# Patient Record
Sex: FEMALE | Race: WHITE
[De-identification: names, ages, dates, MRNs, and addresses within clinical notes are randomized per-mention and may not be internally consistent; named-entity substitution may affect disease eponyms.]

---

## 2017-08-05 ENCOUNTER — HEALTH MAINTENANCE LETTER (OUTPATIENT)
Age: 27
End: 2017-08-05

## 2018-02-10 ENCOUNTER — HEALTH MAINTENANCE LETTER (OUTPATIENT)
Age: 28
End: 2018-02-10

## 2018-08-12 ENCOUNTER — HEALTH MAINTENANCE LETTER (OUTPATIENT)
Age: 28
End: 2018-08-12

## 2019-03-09 ENCOUNTER — HEALTH MAINTENANCE LETTER (OUTPATIENT)
Age: 29
End: 2019-03-09

## 2019-11-05 ENCOUNTER — HEALTH MAINTENANCE LETTER (OUTPATIENT)
Age: 29
End: 2019-11-05

## 2020-11-22 ENCOUNTER — HEALTH MAINTENANCE LETTER (OUTPATIENT)
Age: 30
End: 2020-11-22

## 2021-09-19 ENCOUNTER — HEALTH MAINTENANCE LETTER (OUTPATIENT)
Age: 31
End: 2021-09-19

## 2022-01-09 ENCOUNTER — HEALTH MAINTENANCE LETTER (OUTPATIENT)
Age: 32
End: 2022-01-09

## 2022-07-06 ENCOUNTER — OFFICE VISIT (OUTPATIENT)
Dept: FAMILY MEDICINE | Facility: CLINIC | Age: 32
End: 2022-07-06
Payer: MEDICAID

## 2022-07-06 VITALS
BODY MASS INDEX: 22.97 KG/M2 | SYSTOLIC BLOOD PRESSURE: 115 MMHG | WEIGHT: 137 LBS | HEART RATE: 78 BPM | DIASTOLIC BLOOD PRESSURE: 82 MMHG

## 2022-07-06 DIAGNOSIS — F41.1 GAD (GENERALIZED ANXIETY DISORDER): ICD-10-CM

## 2022-07-06 DIAGNOSIS — F33.3 SEVERE RECURRENT MAJOR DEPRESSIVE DISORDER WITH PSYCHOTIC FEATURES (H): Primary | ICD-10-CM

## 2022-07-06 PROBLEM — K90.0 CELIAC DISEASE: Status: ACTIVE | Noted: 2022-07-06

## 2022-07-06 PROBLEM — E73.9 LACTOSE INTOLERANCE: Status: ACTIVE | Noted: 2022-07-06

## 2022-07-06 PROCEDURE — 99204 OFFICE O/P NEW MOD 45 MIN: CPT | Performed by: NURSE PRACTITIONER

## 2022-07-06 PROCEDURE — 96127 BRIEF EMOTIONAL/BEHAV ASSMT: CPT | Performed by: NURSE PRACTITIONER

## 2022-07-06 RX ORDER — HYDROXYZINE PAMOATE 25 MG/1
25 CAPSULE ORAL DAILY PRN
Qty: 24 CAPSULE | Refills: 1 | Status: SHIPPED | OUTPATIENT
Start: 2022-07-06 | End: 2022-08-05

## 2022-07-06 ASSESSMENT — ANXIETY QUESTIONNAIRES
3. WORRYING TOO MUCH ABOUT DIFFERENT THINGS: NEARLY EVERY DAY
GAD7 TOTAL SCORE: 21
7. FEELING AFRAID AS IF SOMETHING AWFUL MIGHT HAPPEN: NEARLY EVERY DAY
5. BEING SO RESTLESS THAT IT IS HARD TO SIT STILL: NEARLY EVERY DAY
6. BECOMING EASILY ANNOYED OR IRRITABLE: NEARLY EVERY DAY
8. IF YOU CHECKED OFF ANY PROBLEMS, HOW DIFFICULT HAVE THESE MADE IT FOR YOU TO DO YOUR WORK, TAKE CARE OF THINGS AT HOME, OR GET ALONG WITH OTHER PEOPLE?: VERY DIFFICULT
1. FEELING NERVOUS, ANXIOUS, OR ON EDGE: NEARLY EVERY DAY
7. FEELING AFRAID AS IF SOMETHING AWFUL MIGHT HAPPEN: NEARLY EVERY DAY
GAD7 TOTAL SCORE: 21
2. NOT BEING ABLE TO STOP OR CONTROL WORRYING: NEARLY EVERY DAY
4. TROUBLE RELAXING: NEARLY EVERY DAY
GAD7 TOTAL SCORE: 21

## 2022-07-06 ASSESSMENT — PATIENT HEALTH QUESTIONNAIRE - PHQ9
SUM OF ALL RESPONSES TO PHQ QUESTIONS 1-9: 21
SUM OF ALL RESPONSES TO PHQ QUESTIONS 1-9: 21
10. IF YOU CHECKED OFF ANY PROBLEMS, HOW DIFFICULT HAVE THESE PROBLEMS MADE IT FOR YOU TO DO YOUR WORK, TAKE CARE OF THINGS AT HOME, OR GET ALONG WITH OTHER PEOPLE: VERY DIFFICULT
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
## 2022-07-06 NOTE — LETTER
My Depression Action Plan  Name: Ashanti Narayan   Date of Birth 1990  Date: 7/6/2022    My doctor: Zelalem Norton   My clinic: [address] [address] [phone]          GREEN    ZONE   Good Control    What it looks like:     Things are going generally well. You have normal ups and downs. You may even feel depressed from time to time, but bad moods usually last less than a day.   What you need to do:  1. Continue to care for yourself (see self care plan)  2. Check your depression survival kit and update it as needed  3. Follow your physician s recommendations including any medication.  4. Do not stop taking medication unless you consult with your physician first.           YELLOW         ZONE Getting Worse    What it looks like:     Depression is starting to interfere with your life.     It may be hard to get out of bed; you may be starting to isolate yourself from others.    Symptoms of depression are starting to last most all day and this has happened for several days.     You may have suicidal thoughts but they are not constant.   What you need to do:     1. Call your care team. Your response to treatment will improve if you keep your care team informed of your progress. Yellow periods are signs an adjustment may need to be made.     2. Continue your self-care.  Just get dressed and ready for the day.  Don't give yourself time to talk yourself out of it.    3. Talk to someone in your support network.    4. Open up your Depression Self-Care Plan/Wellness Kit.           RED    ZONE Medical Alert - Get Help    What it looks like:     Depression is seriously interfering with your life.     You may experience these or other symptoms: You can t get out of bed most days, can t work or engage in other necessary activities, you have trouble taking care of basic hygiene, or basic responsibilities, thoughts of suicide or death that  will not go away, self-injurious behavior.     What you need to do:  1. Call your care team and request a same-day appointment. If they are not available (weekends or after hours) call your local crisis line, emergency room or 911.          Depression Self-Care Plan / Wellness Kit    Many people find that medication and therapy are helpful treatments for managing depression. In addition, making small changes to your everyday life can help to boost your mood and improve your wellbeing. Below are some tips for you to consider. Be sure to talk with your medical provider and/or behavioral health consultant if your symptoms are worsening or not improving.     Sleep   Sleep hygiene  means all of the habits that support good, restful sleep. It includes maintaining a consistent bedtime and wake time, using your bedroom only for sleeping or sex, and keeping the bedroom dark and free of distractions like a computer, smartphone, or television.     Develop a Healthy Routine  Maintain good hygiene. Get out of bed in the morning, make your bed, brush your teeth, take a shower, and get dressed. Don t spend too much time viewing media that makes you feel stressed. Find time to relax each day.    Exercise  Get some form of exercise every day. This will help reduce pain and release endorphins, the  feel good  chemicals in your brain. It can be as simple as just going for a walk or doing some gardening, anything that will get you moving.      Diet  Strive to eat healthy foods, including fruits and vegetables. Drink plenty of water. Avoid excessive sugar, caffeine, alcohol, and other mood-altering substances.     Stay Connected with Others  Stay in touch with friends and family members.    Manage Your Mood  Try deep breathing, massage therapy, biofeedback, or meditation. Take part in fun activities when you can. Try to find something to smile about each day.     Psychotherapy  Be open to working with a therapist if your provider  recommends it.     Medication  Be sure to take your medication as prescribed. Most anti-depressants need to be taken every day. It usually takes several weeks for medications to work. Not all medicines work for all people. It is important to follow-up with your provider to make sure you have a treatment plan that is working for you. Do not stop your medication abruptly without first discussing it with your provider.    Crisis Resources   These hotlines are for both adults and children. They and are open 24 hours a day, 7 days a week unless noted otherwise.      National Suicide Prevention Lifeline   3-564-978-ISSK (5587)      Crisis Text Line    www.crisistextline.org  Text HOME to 863952 from anywhere in the [place], anytime, about any type of crisis. A live, trained crisis counselor will receive the text and respond quickly.      [facility] Lifeline for LGBTQ Youth  A national crisis intervention and suicide lifeline for LGBTQ youth under 25. Provides a safe place to talk without judgement. Call 1-963.376.2494; text START to 819741 or visit www.thetrevorproject.org to talk to a trained counselor.      For [facility] crisis numbers, visit the [facility] website at:  https://[place].gov/dhs/people-we-serve/adults/health-care/mental-health/resources/crisis-contacts.jsp

## 2022-08-04 DIAGNOSIS — F41.1 GAD (GENERALIZED ANXIETY DISORDER): ICD-10-CM

## 2022-08-04 DIAGNOSIS — F33.3 SEVERE RECURRENT MAJOR DEPRESSIVE DISORDER WITH PSYCHOTIC FEATURES (H): ICD-10-CM

## 2022-08-05 RX ORDER — HYDROXYZINE PAMOATE 25 MG/1
25 CAPSULE ORAL DAILY PRN
Qty: 24 CAPSULE | Refills: 0 | Status: SHIPPED | OUTPATIENT
Start: 2022-08-05 | End: 2023-11-10

## 2022-08-05 NOTE — TELEPHONE ENCOUNTER
TC- please contact patient. 30 day supply of medication sent to pharmacy. Patient is due for follow up visit.      Last Written Prescription Date:  7/6/22  Last Fill Quantity: (45),  # refills: (1)  Last office visit: 7/6/2022   Future Office Visit:      (Due 6 weeks after 7/6/22 for follow up).

## 2022-10-03 ENCOUNTER — OFFICE VISIT (OUTPATIENT)
Dept: FAMILY MEDICINE | Facility: CLINIC | Age: 32
End: 2022-10-03
Payer: COMMERCIAL

## 2022-10-03 VITALS
BODY MASS INDEX: 23.49 KG/M2 | HEIGHT: 65 IN | SYSTOLIC BLOOD PRESSURE: 110 MMHG | HEART RATE: 81 BPM | WEIGHT: 141 LBS | DIASTOLIC BLOOD PRESSURE: 72 MMHG | TEMPERATURE: 98.4 F

## 2022-10-03 DIAGNOSIS — G56.01 CARPAL TUNNEL SYNDROME OF RIGHT WRIST: Primary | ICD-10-CM

## 2022-10-03 PROCEDURE — 99213 OFFICE O/P EST LOW 20 MIN: CPT | Performed by: FAMILY MEDICINE

## 2022-10-03 RX ORDER — PREDNISONE 20 MG/1
20 TABLET ORAL DAILY
Qty: 5 TABLET | Refills: 0 | Status: SHIPPED | OUTPATIENT
Start: 2022-10-03 | End: 2022-10-08

## 2022-10-03 ASSESSMENT — PATIENT HEALTH QUESTIONNAIRE - PHQ9
SUM OF ALL RESPONSES TO PHQ QUESTIONS 1-9: 6
SUM OF ALL RESPONSES TO PHQ QUESTIONS 1-9: 6
10. IF YOU CHECKED OFF ANY PROBLEMS, HOW DIFFICULT HAVE THESE PROBLEMS MADE IT FOR YOU TO DO YOUR WORK, TAKE CARE OF THINGS AT HOME, OR GET ALONG WITH OTHER PEOPLE: SOMEWHAT DIFFICULT

## 2022-10-03 NOTE — LETTER
October 3, 2022      Ashanti Narayan  [address] [place] [id number]        To Whom It May Concern:    Ashanti Narayan was seen in our clinic. She may return to work with the following: limited to light duty - lifting no greater than 20 pounds and no repetitive motions and wear wrist splint for about 2-3 weeks - until seen in follow up.      Sincerely,        Shweta Arias MD
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
## 2022-10-19 ENCOUNTER — OFFICE VISIT (OUTPATIENT)
Dept: FAMILY MEDICINE | Facility: CLINIC | Age: 32
End: 2022-10-19
Payer: COMMERCIAL

## 2022-10-19 VITALS
TEMPERATURE: 98.2 F | HEART RATE: 74 BPM | OXYGEN SATURATION: 97 % | BODY MASS INDEX: 23.9 KG/M2 | DIASTOLIC BLOOD PRESSURE: 60 MMHG | SYSTOLIC BLOOD PRESSURE: 96 MMHG | WEIGHT: 142.5 LBS

## 2022-10-19 DIAGNOSIS — G56.01 CARPAL TUNNEL SYNDROME OF RIGHT WRIST: Primary | ICD-10-CM

## 2022-10-19 DIAGNOSIS — F41.1 GAD (GENERALIZED ANXIETY DISORDER): ICD-10-CM

## 2022-10-19 DIAGNOSIS — F33.3 SEVERE RECURRENT MAJOR DEPRESSIVE DISORDER WITH PSYCHOTIC FEATURES (H): ICD-10-CM

## 2022-10-19 PROCEDURE — 96127 BRIEF EMOTIONAL/BEHAV ASSMT: CPT | Performed by: NURSE PRACTITIONER

## 2022-10-19 PROCEDURE — 99214 OFFICE O/P EST MOD 30 MIN: CPT | Performed by: NURSE PRACTITIONER

## 2022-10-19 RX ORDER — FLUOXETINE HYDROCHLORIDE 60 MG/1
60 TABLET, FILM COATED ORAL; ORAL DAILY
Qty: 30 TABLET | Refills: 1 | Status: SHIPPED | OUTPATIENT
Start: 2022-10-19 | End: 2023-11-10

## 2022-10-19 RX ORDER — PROPRANOLOL HYDROCHLORIDE 20 MG/1
20 TABLET ORAL DAILY PRN
Qty: 30 TABLET | Refills: 1 | Status: SHIPPED | OUTPATIENT
Start: 2022-10-19 | End: 2023-11-10

## 2022-10-19 ASSESSMENT — ENCOUNTER SYMPTOMS: NUMBNESS: 1

## 2022-10-19 ASSESSMENT — ANXIETY QUESTIONNAIRES
7. FEELING AFRAID AS IF SOMETHING AWFUL MIGHT HAPPEN: MORE THAN HALF THE DAYS
1. FEELING NERVOUS, ANXIOUS, OR ON EDGE: MORE THAN HALF THE DAYS
GAD7 TOTAL SCORE: 14
2. NOT BEING ABLE TO STOP OR CONTROL WORRYING: MORE THAN HALF THE DAYS
3. WORRYING TOO MUCH ABOUT DIFFERENT THINGS: MORE THAN HALF THE DAYS
5. BEING SO RESTLESS THAT IT IS HARD TO SIT STILL: MORE THAN HALF THE DAYS
GAD7 TOTAL SCORE: 14
4. TROUBLE RELAXING: MORE THAN HALF THE DAYS
7. FEELING AFRAID AS IF SOMETHING AWFUL MIGHT HAPPEN: MORE THAN HALF THE DAYS
6. BECOMING EASILY ANNOYED OR IRRITABLE: MORE THAN HALF THE DAYS
IF YOU CHECKED OFF ANY PROBLEMS ON THIS QUESTIONNAIRE, HOW DIFFICULT HAVE THESE PROBLEMS MADE IT FOR YOU TO DO YOUR WORK, TAKE CARE OF THINGS AT HOME, OR GET ALONG WITH OTHER PEOPLE: SOMEWHAT DIFFICULT
GAD7 TOTAL SCORE: 14
8. IF YOU CHECKED OFF ANY PROBLEMS, HOW DIFFICULT HAVE THESE MADE IT FOR YOU TO DO YOUR WORK, TAKE CARE OF THINGS AT HOME, OR GET ALONG WITH OTHER PEOPLE?: SOMEWHAT DIFFICULT

## 2022-10-19 ASSESSMENT — PATIENT HEALTH QUESTIONNAIRE - PHQ9
10. IF YOU CHECKED OFF ANY PROBLEMS, HOW DIFFICULT HAVE THESE PROBLEMS MADE IT FOR YOU TO DO YOUR WORK, TAKE CARE OF THINGS AT HOME, OR GET ALONG WITH OTHER PEOPLE: SOMEWHAT DIFFICULT
SUM OF ALL RESPONSES TO PHQ QUESTIONS 1-9: 10
SUM OF ALL RESPONSES TO PHQ QUESTIONS 1-9: 10

## 2022-10-19 NOTE — PROGRESS NOTES
Assessment & Plan     (G56.01) Carpal tunnel syndrome of right wrist  (primary encounter diagnosis)  Comment: Some improvement but still having numbness and tingling in 3 of the fingers on the right hand.  Written a note for her employer regarding work restrictions.  We will set her up with orthopedics  Plan: Orthopedic  Referral            (F41.1) PHILIP (generalized anxiety disorder)  Comment: Improving and that she is sleeping better and her mood is improved.  She has more energy.  Feels less anxious.  She would like to increase dose.  She also did poorly with hydroxyzine as she had some tic symptoms.  She has no contraindications to propranolol and estimates that she would only need to use it a couple times a week.  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    Tiffany Oropeza NP  [facility]    Curtis Burrell is a 31 year old presenting for the following health issues: following up on right wrist - numbness in the fingers, soreness, stiffness in the wrist - not much improvement, is wearing wrist brace 24/7, does not have numbness in second digit of right hand, that would be the only change, also now having some swelling in the fingers    Was seen about 3 weeks ago for carpal tunnel in urgent care.  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  She is not had carpal tunnel in the past but another job volatile at work processing as well.  She seen some improvement with wearing her splint and a burst of prednisone as she no longer has tingling in the pointer  finger but the third fourth and fifth finger the right hand which is her dominant hand still numb and tingly.  She takes brace off about every hour and does some range of motion.  Sometimes her fingers get very pale    She would also like to address refills on her depression and anxiety medication.  Fluoxetine was restarted in July for symptom's of both depression and anxiety.  She was given hydroxyzine for panic and took that a few times but definitely had some jerking symptoms when she used it.  She would like to increase her propranolol.  Its been very effective but she would like better control.  Her PHQ-9 score is 10 with no thoughts of self-harm and her PHILIP-7 is 14.  Musculoskeletal Problem and Numbness      Musculoskeletal Problem  Associated symptoms include numbness.   Numbness  Associated symptoms include numbness.   History of Present Illness       Reason for visit:  Keshia on wrist  and mental health check    She eats 2-3 servings of fruits and vegetables daily.She consumes 0 sweetened beverage(s) daily.She exercises with enough effort to increase her heart rate 60 or more minutes per day.  She exercises with enough effort to increase her heart rate 4 days per week.   She is taking medications regularly.    Today's PHQ-9         PHQ-9 Total Score: 10    PHQ-9 Q9 Thoughts of better off dead/self-harm past 2 weeks :   Not at all    How difficult have these problems made it for you to do your work, take care of things at home, or get along with other people: Somewhat difficult  Today's PHILIP-7 Score: 14             Review of Systems   Neurological: Positive for numbness.            Objective    BP 96/60 (BP Location: Right arm, Patient Position: Sitting)   Pulse 74   Temp 98.2  F (36.8  C)   Wt 64.6 kg (142 lb 8 oz)   LMP 10/18/2013   SpO2 97%   BMI 23.90 kg/m    Body mass index is 23.9 kg/m .  Physical Exam     Good color and motion of fingers on her right hand no swelling currently  Good eye contact  speech is not forced, she is engaging with conversation

## 2022-10-19 NOTE — LETTER
[facility]  [address] [address]  [phone]  Dept: [phone]      10/19/2022    Re: Ashanti Narayan      TO WHOM IT MAY CONCERN:    Ashanti Narayan  was seen on 10/19/2022.  She is still symptomatic with numbness in right hand fingers and requires non-repetitive light duty to promote healing until she is further evaluated by the orthopedic specialist.    Carie Oropeza NP  [facility]

## 2022-10-25 ENCOUNTER — TRANSFERRED RECORDS (OUTPATIENT)
Dept: HEALTH INFORMATION MANAGEMENT | Facility: CLINIC | Age: 32
End: 2022-10-25

## 2022-11-21 ENCOUNTER — HEALTH MAINTENANCE LETTER (OUTPATIENT)
Age: 32
End: 2022-11-21

## 2023-04-16 ENCOUNTER — HEALTH MAINTENANCE LETTER (OUTPATIENT)
Age: 33
End: 2023-04-16

## 2023-08-22 ENCOUNTER — E-VISIT (OUTPATIENT)
Dept: FAMILY MEDICINE | Facility: CLINIC | Age: 33
End: 2023-08-22
Payer: COMMERCIAL

## 2023-08-22 DIAGNOSIS — F41.1 GAD (GENERALIZED ANXIETY DISORDER): Primary | ICD-10-CM

## 2023-08-22 PROCEDURE — 99207 PR NON-BILLABLE SERV PER CHARTING: CPT | Performed by: NURSE PRACTITIONER

## 2023-08-22 NOTE — PATIENT INSTRUCTIONS
Thank you for choosing us for your care. I think an in-clinic visit would be best next steps based on your symptoms. Please schedule a clinic appointment; you won t be charged for this eVisit.      You can schedule an appointment right here in [facility], or call [phone]

## 2023-11-10 ENCOUNTER — OFFICE VISIT (OUTPATIENT)
Dept: FAMILY MEDICINE | Facility: CLINIC | Age: 33
End: 2023-11-10
Payer: COMMERCIAL

## 2023-11-10 VITALS
HEART RATE: 87 BPM | TEMPERATURE: 98 F | BODY MASS INDEX: 22.71 KG/M2 | DIASTOLIC BLOOD PRESSURE: 68 MMHG | OXYGEN SATURATION: 99 % | HEIGHT: 65 IN | SYSTOLIC BLOOD PRESSURE: 98 MMHG | WEIGHT: 136.3 LBS

## 2023-11-10 DIAGNOSIS — Z11.59 NEED FOR HEPATITIS C SCREENING TEST: ICD-10-CM

## 2023-11-10 DIAGNOSIS — R19.7 DIARRHEA, UNSPECIFIED TYPE: ICD-10-CM

## 2023-11-10 DIAGNOSIS — R20.2 NUMBNESS AND TINGLING OF BOTH LEGS: ICD-10-CM

## 2023-11-10 DIAGNOSIS — F41.1 GAD (GENERALIZED ANXIETY DISORDER): ICD-10-CM

## 2023-11-10 DIAGNOSIS — R20.0 NUMBNESS AND TINGLING IN BOTH HANDS: ICD-10-CM

## 2023-11-10 DIAGNOSIS — R20.2 NUMBNESS AND TINGLING IN BOTH HANDS: ICD-10-CM

## 2023-11-10 DIAGNOSIS — R55 SYNCOPE, UNSPECIFIED SYNCOPE TYPE: Primary | ICD-10-CM

## 2023-11-10 DIAGNOSIS — R20.0 NUMBNESS AND TINGLING OF BOTH LEGS: ICD-10-CM

## 2023-11-10 DIAGNOSIS — M54.2 NECK PAIN: ICD-10-CM

## 2023-11-10 DIAGNOSIS — R53.83 OTHER FATIGUE: ICD-10-CM

## 2023-11-10 DIAGNOSIS — F33.3 SEVERE RECURRENT MAJOR DEPRESSIVE DISORDER WITH PSYCHOTIC FEATURES (H): ICD-10-CM

## 2023-11-10 LAB
ALBUMIN SERPL BCG-MCNC: 4.3 G/DL (ref 3.5–5.2)
ALP SERPL-CCNC: 36 U/L (ref 35–104)
ALT SERPL W P-5'-P-CCNC: 11 U/L (ref 0–50)
AMYLASE SERPL-CCNC: 66 U/L (ref 28–100)
ANION GAP SERPL CALCULATED.3IONS-SCNC: 10 MMOL/L (ref 7–15)
AST SERPL W P-5'-P-CCNC: 15 U/L (ref 0–45)
BILIRUB SERPL-MCNC: 0.4 MG/DL
BUN SERPL-MCNC: 4.5 MG/DL (ref 6–20)
CALCIUM SERPL-MCNC: 9.3 MG/DL (ref 8.6–10)
CHLORIDE SERPL-SCNC: 105 MMOL/L (ref 98–107)
CREAT SERPL-MCNC: 0.58 MG/DL (ref 0.51–0.95)
DEPRECATED HCO3 PLAS-SCNC: 25 MMOL/L (ref 22–29)
EGFRCR SERPLBLD CKD-EPI 2021: >90 ML/MIN/1.73M2
ERYTHROCYTE [DISTWIDTH] IN BLOOD BY AUTOMATED COUNT: 11.8 % (ref 10–15)
FERRITIN SERPL-MCNC: 140 NG/ML (ref 6–175)
GLUCOSE SERPL-MCNC: 91 MG/DL (ref 70–99)
HCT VFR BLD AUTO: 36.3 % (ref 35–47)
HCV AB SERPL QL IA: NONREACTIVE
HGB BLD-MCNC: 11.9 G/DL (ref 11.7–15.7)
LIPASE SERPL-CCNC: 27 U/L (ref 13–60)
MCH RBC QN AUTO: 31.2 PG (ref 26.5–33)
MCHC RBC AUTO-ENTMCNC: 32.8 G/DL (ref 31.5–36.5)
MCV RBC AUTO: 95 FL (ref 78–100)
PLATELET # BLD AUTO: 221 10E3/UL (ref 150–450)
POTASSIUM SERPL-SCNC: 4.2 MMOL/L (ref 3.4–5.3)
PROT SERPL-MCNC: 6.6 G/DL (ref 6.4–8.3)
RBC # BLD AUTO: 3.81 10E6/UL (ref 3.8–5.2)
SODIUM SERPL-SCNC: 140 MMOL/L (ref 135–145)
TSH SERPL DL<=0.005 MIU/L-ACNC: 1 UIU/ML (ref 0.3–4.2)
VIT B12 SERPL-MCNC: 736 PG/ML (ref 232–1245)
WBC # BLD AUTO: 6.5 10E3/UL (ref 4–11)

## 2023-11-10 PROCEDURE — 93000 ELECTROCARDIOGRAM COMPLETE: CPT | Performed by: NURSE PRACTITIONER

## 2023-11-10 PROCEDURE — 86803 HEPATITIS C AB TEST: CPT | Performed by: NURSE PRACTITIONER

## 2023-11-10 PROCEDURE — 85027 COMPLETE CBC AUTOMATED: CPT | Performed by: NURSE PRACTITIONER

## 2023-11-10 PROCEDURE — 80053 COMPREHEN METABOLIC PANEL: CPT | Performed by: NURSE PRACTITIONER

## 2023-11-10 PROCEDURE — 82728 ASSAY OF FERRITIN: CPT | Performed by: NURSE PRACTITIONER

## 2023-11-10 PROCEDURE — 99000 SPECIMEN HANDLING OFFICE-LAB: CPT | Performed by: NURSE PRACTITIONER

## 2023-11-10 PROCEDURE — 96127 BRIEF EMOTIONAL/BEHAV ASSMT: CPT | Performed by: NURSE PRACTITIONER

## 2023-11-10 PROCEDURE — 99215 OFFICE O/P EST HI 40 MIN: CPT | Mod: 25 | Performed by: NURSE PRACTITIONER

## 2023-11-10 PROCEDURE — 82607 VITAMIN B-12: CPT | Performed by: NURSE PRACTITIONER

## 2023-11-10 PROCEDURE — 36415 COLL VENOUS BLD VENIPUNCTURE: CPT | Performed by: NURSE PRACTITIONER

## 2023-11-10 PROCEDURE — 83690 ASSAY OF LIPASE: CPT | Performed by: NURSE PRACTITIONER

## 2023-11-10 PROCEDURE — 84443 ASSAY THYROID STIM HORMONE: CPT | Performed by: NURSE PRACTITIONER

## 2023-11-10 PROCEDURE — 82150 ASSAY OF AMYLASE: CPT | Performed by: NURSE PRACTITIONER

## 2023-11-10 PROCEDURE — 84591 ASSAY OF NOS VITAMIN: CPT | Mod: 90 | Performed by: NURSE PRACTITIONER

## 2023-11-10 RX ORDER — PROPRANOLOL HYDROCHLORIDE 20 MG/1
20 TABLET ORAL DAILY PRN
Qty: 30 TABLET | Refills: 1 | Status: SHIPPED | OUTPATIENT
Start: 2023-11-10 | End: 2023-11-27

## 2023-11-10 ASSESSMENT — ANXIETY QUESTIONNAIRES
1. FEELING NERVOUS, ANXIOUS, OR ON EDGE: MORE THAN HALF THE DAYS
GAD7 TOTAL SCORE: 11
3. WORRYING TOO MUCH ABOUT DIFFERENT THINGS: MORE THAN HALF THE DAYS
4. TROUBLE RELAXING: SEVERAL DAYS
6. BECOMING EASILY ANNOYED OR IRRITABLE: SEVERAL DAYS
7. FEELING AFRAID AS IF SOMETHING AWFUL MIGHT HAPPEN: MORE THAN HALF THE DAYS
GAD7 TOTAL SCORE: 11
5. BEING SO RESTLESS THAT IT IS HARD TO SIT STILL: SEVERAL DAYS
2. NOT BEING ABLE TO STOP OR CONTROL WORRYING: MORE THAN HALF THE DAYS
IF YOU CHECKED OFF ANY PROBLEMS ON THIS QUESTIONNAIRE, HOW DIFFICULT HAVE THESE PROBLEMS MADE IT FOR YOU TO DO YOUR WORK, TAKE CARE OF THINGS AT HOME, OR GET ALONG WITH OTHER PEOPLE: SOMEWHAT DIFFICULT

## 2023-11-10 ASSESSMENT — PATIENT HEALTH QUESTIONNAIRE - PHQ9
SUM OF ALL RESPONSES TO PHQ QUESTIONS 1-9: 12
SUM OF ALL RESPONSES TO PHQ QUESTIONS 1-9: 12
10. IF YOU CHECKED OFF ANY PROBLEMS, HOW DIFFICULT HAVE THESE PROBLEMS MADE IT FOR YOU TO DO YOUR WORK, TAKE CARE OF THINGS AT HOME, OR GET ALONG WITH OTHER PEOPLE: VERY DIFFICULT

## 2023-11-10 NOTE — PROGRESS NOTES
Assessment & Plan     (R55) Syncope, unspecified syncope type  (primary encounter diagnosis)  Comment: This is a recurrent problem for her, she has episodes of true syncope most recent ones in the shower.  She feels it coming on when her legs and feet get tingly so she has enough to sit down.  This will need further evaluation but today we agreed on the EKG which was essentially normal and to start with some blood work.  Plan: EKG 12-lead complete w/read - Clinics,         CANCELED: EKG 12-lead complete w/read - Clinics            (Z11.59) Need for hepatitis C screening test  Comment:   Plan: Hepatitis C Screen Reflex to HCV RNA Quant and         Genotype            (R53.83) Other fatigue  Comment: Fatigue is very vague.  She agrees to start with some blood testing.  This may be related to depression or anxiety as well.  Plan: CBC with platelets, Comprehensive metabolic         panel, Ferritin            (R20.0,  R20.2) Numbness and tingling of both legs  Comment: This also was a problem is been going on for many months.  We will start with some blood tests.  She had a rapid weight loss of 170 pounds over 18 months and she is been very stable at 135 to 140 pounds but the tingling developed after the weight loss  Plan:     (R20.0,  R20.2) Numbness and tingling in both hands  Comment:   Plan: Vitamin B12, Ferritin, TSH, Vitamin B3 (Niacin         and Metabolites)        We will watch for some vitamin deficiencies as possible causes    (M54.2) Neck pain  Comment:   Plan: She had a great deal of physical therapy for multiple joint pains the neck pain is new she is willing to do some therapy for that as well    (R19.7) Diarrhea, unspecified type  Comment:   Plan: Lipase, Amylase, TSH        She has bowel movement after every time she eats.  This is new since her weight loss and although she has no blood in her stool and no vomiting she has trouble eating multiple foods because of gluten and lactose intolerance.  She is  never had a colonoscopy to evaluate this and she agreed that if lab results were normal during follow-up we would get her set up for colonoscopy.  She did have gallbladder surgery in the past    (F33.3) Severe recurrent major depressive disorder with psychotic features (H)  Comment:   Plan: FLUoxetine (PROZAC) 20 MG capsule        She is been off her medication for about a year.  She feels she needs a higher dose than what she was on but is agreeable to starting at a lower dose and working up and doing some follow-up in about a month    (F41.1) PHILIP (generalized anxiety disorder)  Comment: She did not tolerate the hydroxyzine but like to restart the propranolol  Plan: propranolol (INDERAL) 20 MG tablet                     Nicotine/Tobacco Cessation:  She reports that she has been smoking cigarettes. She has a 2.10 pack-year smoking history. She has never used smokeless tobacco.  Nicotine/Tobacco Cessation Plan:   She is down to vaping a very small amount and feels that she could give up to 1 or 2 cigarettes that she currently has daily          Tiffany Oropeza NP  [facility]    Curtis Burrell is a 33 year old, presenting for the following health issues: pain - shoulders and neck, dizziness - comes and goes, numbness in the legs, numbness in right arm/hand, passed out in the shower last week, migraines for years but have been worsening, IBS - stool is different, loose, oily almost all the time, nausea - worse with gluten or dairy, abdominal pain, also discuss medications - has quit taking all her meds, significant weight loss over the past 2-3 years  Gastrointestinal Problem, Neck Pain, Shoulder Pain, Dizziness, and Numbness        11/10/2023     8:18 AM   Additional Questions   Roomed by alfonso clarke   Accompanied by self     History of Present Illness       Reason for visit:  Multiple symptoms  Symptom onset:  More than a month  Symptoms include:  To many to list  Symptom intensity:   Moderate  Symptom progression:  Worsening  Had these symptoms before:  Yes  Has tried/received treatment for these symptoms:  No  What makes it worse:  Cold weather , standing for long, foods,and more  What makes it better:  Avoiding certain foods    She eats 0-1 servings of fruits and vegetables daily.She consumes 0 sweetened beverage(s) daily.She exercises with enough effort to increase her heart rate 10 to 19 minutes per day.  She exercises with enough effort to increase her heart rate 3 or less days per week. She is missing 7 dose(s) of medications per week.     PATIENT HEALTH QUESTIONNAIRE-9 (PHQ - 9)    Over the last 2 weeks, how often have you been bothered by any of the following problems?    1. Little interest or pleasure in doing things -  More than half the days   2. Feeling down, depressed, or hopeless -  Several days   3. Trouble falling or staying asleep, or sleeping too much - More than half the days   4. Feeling tired or having little energy -  Nearly every day   5. Poor appetite or overeating -  Several days   6. Feeling bad about yourself - or that you are a failure or have let yourself or your family down -  Several days   7. Trouble concentrating on things, such as reading the newspaper or watching television - More than half the days   8. Moving or speaking so slowly that other people could have noticed? Or the opposite - being so fidgety or restless that you have been moving around a lot more than usual Not at all   9. Thoughts that you would be better off dead or of hurting  yourself in some way Not at all   Total Score: 12     If you checked off any problems, how difficult have these problems made it for you to do your work, take care of things at home, or get along with other people?      Developed by Daria Morgan, Marissa Rosa, Gabriele Eldridge and colleagues, with an educational eder from [employer] No permission required to reproduce, translate, display or distribute.  "permission required to reproduce, translate, display or distribute.     GAD7 score: 11           Review of Systems         Objective    BP 98/68 (BP Location: Right arm, Patient Position: Sitting)   Pulse 87   Temp 98  F (36.7  C)   Ht 1.645 m (5' 4.75\")   Wt 61.8 kg (136 lb 4.8 oz)   LMP 10/18/2013   SpO2 99%   Breastfeeding No   BMI 22.86 kg/m    Body mass index is 22.86 kg/m .  Physical Exam   GENERAL: healthy, alert and no distress  EYES: Eyes grossly normal to inspection, PERRL and conjunctivae and sclerae normal  HENT: ear canals and TM's normal, nose and mouth without ulcers or lesions  NECK: no adenopathy, no asymmetry, masses, or scars and thyroid normal to palpation  RESP: lungs clear to auscultation - no rales, rhonchi or wheezes  CV: regular rate and rhythm, normal S1 S2, no S3 or S4, no murmur, click or rub, no peripheral edema and peripheral pulses strong  ABDOMEN: soft, nontender, no hepatosplenomegaly, no masses and bowel sounds normal  MS: no gross musculoskeletal defects noted, no edema  SKIN: no suspicious lesions or rashes  NEURO: Normal strength and tone, mentation intact and speech normal  PSYCH: mentation appears normal, affect normal/bright    EKG - Reviewed and interpreted by me appears normal, NSR, normal axis, normal intervals, no acute ST/T changes c/w ischemia, no LVH by voltage criteria    Over 40 minutes spent with patient and chart prep, face-to-face and documentation.  She agrees to follow-up in 2 to 3 weeks to readdress some concerns that we did not get to today and to follow-up with those concerns including lab results.  We will need to get her set up for colonoscopy.  She likely will need some physical therapy and neurology referral as well as treatment for migraine.  She is also expressed symptoms of Raynaud's sweats possible an ACE inhibitor might be a good choice although she runs with the low blood pressure so this would need to be monitored.    Her previous work-ups " were through [facility] but she says they are about 5 years ago

## 2023-11-17 LAB
NIACIN SERPL-MCNC: NORMAL NG/ML
NICOTINAMIDE SERPL-MCNC: NORMAL NG/ML
NICOTINURATE SERPL-MCNC: NORMAL NG/ML

## 2023-11-27 ENCOUNTER — OFFICE VISIT (OUTPATIENT)
Dept: FAMILY MEDICINE | Facility: CLINIC | Age: 33
End: 2023-11-27
Payer: COMMERCIAL

## 2023-11-27 ENCOUNTER — ANCILLARY PROCEDURE (OUTPATIENT)
Dept: GENERAL RADIOLOGY | Facility: CLINIC | Age: 33
End: 2023-11-27
Attending: NURSE PRACTITIONER
Payer: COMMERCIAL

## 2023-11-27 VITALS
HEART RATE: 62 BPM | TEMPERATURE: 98.9 F | HEIGHT: 65 IN | OXYGEN SATURATION: 98 % | DIASTOLIC BLOOD PRESSURE: 66 MMHG | SYSTOLIC BLOOD PRESSURE: 108 MMHG | WEIGHT: 133 LBS | BODY MASS INDEX: 22.16 KG/M2

## 2023-11-27 DIAGNOSIS — M54.2 NECK PAIN: ICD-10-CM

## 2023-11-27 DIAGNOSIS — F17.200 NICOTINE DEPENDENCE, UNCOMPLICATED, UNSPECIFIED NICOTINE PRODUCT TYPE: ICD-10-CM

## 2023-11-27 DIAGNOSIS — R06.02 SHORTNESS OF BREATH: ICD-10-CM

## 2023-11-27 DIAGNOSIS — R55 SYNCOPE, UNSPECIFIED SYNCOPE TYPE: Primary | ICD-10-CM

## 2023-11-27 DIAGNOSIS — F41.1 GAD (GENERALIZED ANXIETY DISORDER): ICD-10-CM

## 2023-11-27 PROCEDURE — 99215 OFFICE O/P EST HI 40 MIN: CPT | Performed by: NURSE PRACTITIONER

## 2023-11-27 PROCEDURE — 71046 X-RAY EXAM CHEST 2 VIEWS: CPT | Mod: TC | Performed by: RADIOLOGY

## 2023-11-27 PROCEDURE — 96127 BRIEF EMOTIONAL/BEHAV ASSMT: CPT | Performed by: NURSE PRACTITIONER

## 2023-11-27 RX ORDER — PROPRANOLOL HYDROCHLORIDE 20 MG/1
20 TABLET ORAL DAILY PRN
Qty: 30 TABLET | Refills: 1 | Status: SHIPPED | OUTPATIENT
Start: 2023-11-27 | End: 2024-01-16

## 2023-11-27 RX ORDER — ESCITALOPRAM OXALATE 10 MG/1
10 TABLET ORAL DAILY
Qty: 30 TABLET | Refills: 1 | Status: SHIPPED | OUTPATIENT
Start: 2023-11-27 | End: 2024-08-16

## 2023-11-27 RX ORDER — BUSPIRONE HYDROCHLORIDE 10 MG/1
TABLET ORAL
Qty: 90 TABLET | Refills: 0 | Status: SHIPPED | OUTPATIENT
Start: 2023-11-27 | End: 2024-02-14

## 2023-11-27 ASSESSMENT — ANXIETY QUESTIONNAIRES
6. BECOMING EASILY ANNOYED OR IRRITABLE: NEARLY EVERY DAY
IF YOU CHECKED OFF ANY PROBLEMS ON THIS QUESTIONNAIRE, HOW DIFFICULT HAVE THESE PROBLEMS MADE IT FOR YOU TO DO YOUR WORK, TAKE CARE OF THINGS AT HOME, OR GET ALONG WITH OTHER PEOPLE: VERY DIFFICULT
GAD7 TOTAL SCORE: 19
4. TROUBLE RELAXING: MORE THAN HALF THE DAYS
1. FEELING NERVOUS, ANXIOUS, OR ON EDGE: NEARLY EVERY DAY
GAD7 TOTAL SCORE: 19
7. FEELING AFRAID AS IF SOMETHING AWFUL MIGHT HAPPEN: NEARLY EVERY DAY
2. NOT BEING ABLE TO STOP OR CONTROL WORRYING: NEARLY EVERY DAY
3. WORRYING TOO MUCH ABOUT DIFFERENT THINGS: NEARLY EVERY DAY
5. BEING SO RESTLESS THAT IT IS HARD TO SIT STILL: MORE THAN HALF THE DAYS

## 2023-11-27 ASSESSMENT — PATIENT HEALTH QUESTIONNAIRE - PHQ9
SUM OF ALL RESPONSES TO PHQ QUESTIONS 1-9: 14
10. IF YOU CHECKED OFF ANY PROBLEMS, HOW DIFFICULT HAVE THESE PROBLEMS MADE IT FOR YOU TO DO YOUR WORK, TAKE CARE OF THINGS AT HOME, OR GET ALONG WITH OTHER PEOPLE: VERY DIFFICULT
SUM OF ALL RESPONSES TO PHQ QUESTIONS 1-9: 14

## 2023-11-27 NOTE — PROGRESS NOTES
Assessment & Plan     (R55) Syncope, unspecified syncope type  (primary encounter diagnosis)  Comment:   Plan: Adult Cardiology [facility] Referral, Adult        Holter Monitor 48 hour            This has been an ongoing problem for the past.  There is never shortness of breath or chest pain associated with this but her legs get numb and tingly and she passes out.  This has happened in the shower and when doing housework.  Most recently her 17-year-old son found her passed out.  She has never had a cardiac workup but is willing to see cardiology.  Will set her up with Holter monitor hopefully this can be done before she sees cardiology.    (F17.200) Nicotine dependence, uncomplicated, unspecified nicotine product type  Comment:   Plan: She is unsuccessful in cutting back on nicotine use.  No smoking for 2 weeks but has continued to vape although she is cut that in half as well, declines [place] quit plan at this time    (R06.02) Shortness of breath  Comment:   Complaint of shortness of breath increasing over the past year, notices it when she is hiking, will become short of breath after half mile of hiking.  No chest pain.  Counseled this could be related to nicotine or vaping, will start with chest x-ray and if that is normal she is planning on seeing cardiology and if that workup is normal we will need to further consider referral to pulmonology plan: XR Chest 2 Views            (M54.2) Neck pain  Comment:   Plan: Physical Therapy Referral        Her neck pain shooting into her arm does seem to be the cause of a lot of her anxiety.  She is willing to consider physical therapy as this might help guide next steps as well    (F41.1) PHILIP (generalized anxiety disorder)  Comment: Did not do well with fluoxetine.  She numbers today that she is tried sertraline in the past and did not do well with that.  Today she tells me that she did try to hurt herself in her teenage years but now she has 3 children and is a  "single mom caring for them so she does not think about hurting herself.  That being said she is a great deal of anxiety she is very pleased with how the propranolol helps and does not make her sleepy like the hydroxyzine made her sleepy.  She has a long commute to work and [place].  She would be willing to try Lexapro for anxiety and to see counselor many times risky.  Messages sent.  Plan: busPIRone (BUSPAR) 10 MG tablet, propranolol         (INDERAL) 20 MG tablet, escitalopram (LEXAPRO)         10 MG tablet            Follow-up with me in 1 month sooner if needed                 Tiffany Oropeza NP  [facility]    Curtis Burrell is a 33 year old, presenting for the following health issues: here to follow up on medications, labs, and EKG results, stopped taking Fluoxetine after one week because it made her \"sick\", when she increased the dose to 40mg had vomiting so went back to  Believes her anxiety is related to her pain in back and neck.  Does home exercises that help, uses topical CBD cream and bio freeze. PAIN INTO R HAND. EmG WAS DENIES WITH PREVIOUS INSURANCE 2 YEARS AGO.    LOOSE OILY STOOL AFTER EACH  MEAL, 4-5 TIMES PER DAY.  hAD LOST #170 POUNDS THROUGH DIET/EXERCISE ABOUT 3 YEARS AGO. sHE cut dairy/gluten/sugar.  Has kept this wt off , ideal wt is current wt    Syncope in shower and when cleaning. States she blacks out, son saw her passed out.  She has had 3 episodes in the past month.  This has been going on for at least a year.    Gets some SOB with hiking, worsening this summer    Hasn't smoked in 2 weeks; still vaping daily but half as much. Started smoking age 8 year.  No cough        Recheck Medication, Anxiety, Depression, and Results        11/27/2023     1:11 PM   Additional Questions   Roomed by alfonso clarke   Accompanied by self     History of Present Illness       Mental Health Follow-up:  Patient presents to follow-up on Depression & Anxiety.Patient's " depression since last visit has been:  No change  The patient is having other symptoms associated with depression.  Patient's anxiety since last visit has been:  Worse  The patient is having other symptoms associated with anxiety.  Any significant life events: No  Patient is feeling anxious or having panic attacks.  Patient has no concerns about alcohol or drug use.    Reason for visit:  Follow up    She eats 2-3 servings of fruits and vegetables daily.She consumes 0 sweetened beverage(s) daily.She exercises with enough effort to increase her heart rate 30 to 60 minutes per day.  She exercises with enough effort to increase her heart rate 5 days per week. She is missing 2 dose(s) of medications per week.     PATIENT HEALTH QUESTIONNAIRE-9 (PHQ - 9)    Over the last 2 weeks, how often have you been bothered by any of the following problems?    1. Little interest or pleasure in doing things -  Several days   2. Feeling down, depressed, or hopeless -  Several days   3. Trouble falling or staying asleep, or sleeping too much - Several days   4. Feeling tired or having little energy -  Nearly every day   5. Poor appetite or overeating -  More than half the days   6. Feeling bad about yourself - or that you are a failure or have let yourself or your family down -  More than half the days   7. Trouble concentrating on things, such as reading the newspaper or watching television - More than half the days   8. Moving or speaking so slowly that other people could have noticed? Or the opposite - being so fidgety or restless that you have been moving around a lot more than usual More than half the days   9. Thoughts that you would be better off dead or of hurting  yourself in some way Not at all   Total Score: 14     If you checked off any problems, how difficult have these problems made it for you to do your work, take care of things at home, or get along with other people?      Developed by Daria Morgan, Marissa LIMA  "Gabriele Rosa and colleagues, with an educational eder from [employer] No permission required to reproduce, translate, display or distribute. permission required to reproduce, translate, display or distribute.     GAD7 score: 19           Review of Systems         Objective    /66 (BP Location: Right arm, Patient Position: Sitting)   Pulse 62   Temp 98.9  F (37.2  C)   Ht 1.645 m (5' 4.75\")   Wt 60.3 kg (133 lb)   LMP 10/18/2013   SpO2 98%   Breastfeeding No   BMI 22.30 kg/m    Body mass index is 22.3 kg/m .  Physical Exam   GENERAL: healthy, alert and no distress  MS: no gross musculoskeletal defects noted, no edema    Xray - Reviewed and interpreted by me.  CXR appears normal to me, will be over read by radiology      42 min in chart prep, counseling, face to face, documentation                  "

## 2023-11-29 ENCOUNTER — ANCILLARY PROCEDURE (OUTPATIENT)
Dept: CARDIOLOGY | Facility: CLINIC | Age: 33
End: 2023-11-29
Attending: NURSE PRACTITIONER
Payer: COMMERCIAL

## 2023-11-29 DIAGNOSIS — R55 SYNCOPE, UNSPECIFIED SYNCOPE TYPE: ICD-10-CM

## 2023-12-01 ENCOUNTER — TELEPHONE (OUTPATIENT)
Dept: BEHAVIORAL HEALTH | Facility: CLINIC | Age: 33
End: 2023-12-01
Payer: COMMERCIAL

## 2023-12-01 NOTE — TELEPHONE ENCOUNTER
[facility] received a referral from pt's PCP.  [facility] reached pt via phone and described C role briefly.  Pt driving and was not able to make an appointment.  [facility] informed pt that a my chart message has been sent as well with phone # for intake to call and set up an appointment if she is interested.     OSCAR Miller on 12/1/2023 at 12:13 PM

## 2023-12-02 ENCOUNTER — TELEPHONE (OUTPATIENT)
Dept: FAMILY MEDICINE | Facility: CLINIC | Age: 33
End: 2023-12-02

## 2023-12-02 DIAGNOSIS — R20.2 NUMBNESS AND TINGLING OF BOTH LEGS: ICD-10-CM

## 2023-12-02 DIAGNOSIS — R20.0 NUMBNESS AND TINGLING OF BOTH LEGS: ICD-10-CM

## 2023-12-02 DIAGNOSIS — R55 SYNCOPE, UNSPECIFIED SYNCOPE TYPE: Primary | ICD-10-CM

## 2024-01-01 PROCEDURE — 93224 XTRNL ECG REC UP TO 48 HRS: CPT | Performed by: INTERNAL MEDICINE

## 2024-01-16 ENCOUNTER — OFFICE VISIT (OUTPATIENT)
Dept: FAMILY MEDICINE | Facility: CLINIC | Age: 34
End: 2024-01-16
Attending: NURSE PRACTITIONER
Payer: COMMERCIAL

## 2024-01-16 VITALS
WEIGHT: 138.1 LBS | HEART RATE: 74 BPM | SYSTOLIC BLOOD PRESSURE: 104 MMHG | OXYGEN SATURATION: 99 % | HEIGHT: 65 IN | TEMPERATURE: 98.2 F | BODY MASS INDEX: 23.01 KG/M2 | DIASTOLIC BLOOD PRESSURE: 64 MMHG

## 2024-01-16 DIAGNOSIS — F41.1 GAD (GENERALIZED ANXIETY DISORDER): Primary | ICD-10-CM

## 2024-01-16 DIAGNOSIS — R55 SYNCOPE, UNSPECIFIED SYNCOPE TYPE: ICD-10-CM

## 2024-01-16 DIAGNOSIS — G43.109 MIGRAINE WITH AURA AND WITHOUT STATUS MIGRAINOSUS, NOT INTRACTABLE: ICD-10-CM

## 2024-01-16 DIAGNOSIS — R20.2 NUMBNESS AND TINGLING OF BOTH LEGS: ICD-10-CM

## 2024-01-16 DIAGNOSIS — R20.0 NUMBNESS AND TINGLING OF BOTH LEGS: ICD-10-CM

## 2024-01-16 PROCEDURE — 99214 OFFICE O/P EST MOD 30 MIN: CPT | Performed by: NURSE PRACTITIONER

## 2024-01-16 RX ORDER — ESCITALOPRAM OXALATE 10 MG/1
TABLET ORAL
Qty: 45 TABLET | Refills: 2 | Status: SHIPPED | OUTPATIENT
Start: 2024-01-16 | End: 2024-07-20

## 2024-01-16 RX ORDER — BUSPIRONE HYDROCHLORIDE 10 MG/1
TABLET ORAL
Qty: 90 TABLET | Refills: 0 | Status: CANCELLED | OUTPATIENT
Start: 2024-01-16

## 2024-01-16 RX ORDER — ESCITALOPRAM OXALATE 10 MG/1
10 TABLET ORAL DAILY
Qty: 30 TABLET | Refills: 1 | Status: CANCELLED | OUTPATIENT
Start: 2024-01-16

## 2024-01-16 RX ORDER — RIZATRIPTAN BENZOATE 5 MG/1
5 TABLET ORAL
Qty: 9 TABLET | Refills: 3 | Status: SHIPPED | OUTPATIENT
Start: 2024-01-16 | End: 2024-07-09

## 2024-01-16 RX ORDER — PROPRANOLOL HYDROCHLORIDE 20 MG/1
20 TABLET ORAL DAILY PRN
Qty: 30 TABLET | Refills: 11 | Status: SHIPPED | OUTPATIENT
Start: 2024-01-16 | End: 2024-08-29

## 2024-01-16 ASSESSMENT — ANXIETY QUESTIONNAIRES
1. FEELING NERVOUS, ANXIOUS, OR ON EDGE: MORE THAN HALF THE DAYS
3. WORRYING TOO MUCH ABOUT DIFFERENT THINGS: SEVERAL DAYS
2. NOT BEING ABLE TO STOP OR CONTROL WORRYING: MORE THAN HALF THE DAYS
GAD7 TOTAL SCORE: 9
5. BEING SO RESTLESS THAT IT IS HARD TO SIT STILL: SEVERAL DAYS
7. FEELING AFRAID AS IF SOMETHING AWFUL MIGHT HAPPEN: SEVERAL DAYS
7. FEELING AFRAID AS IF SOMETHING AWFUL MIGHT HAPPEN: SEVERAL DAYS
4. TROUBLE RELAXING: SEVERAL DAYS
GAD7 TOTAL SCORE: 9
8. IF YOU CHECKED OFF ANY PROBLEMS, HOW DIFFICULT HAVE THESE MADE IT FOR YOU TO DO YOUR WORK, TAKE CARE OF THINGS AT HOME, OR GET ALONG WITH OTHER PEOPLE?: SOMEWHAT DIFFICULT
GAD7 TOTAL SCORE: 9
6. BECOMING EASILY ANNOYED OR IRRITABLE: SEVERAL DAYS
IF YOU CHECKED OFF ANY PROBLEMS ON THIS QUESTIONNAIRE, HOW DIFFICULT HAVE THESE PROBLEMS MADE IT FOR YOU TO DO YOUR WORK, TAKE CARE OF THINGS AT HOME, OR GET ALONG WITH OTHER PEOPLE: SOMEWHAT DIFFICULT

## 2024-01-16 ASSESSMENT — ENCOUNTER SYMPTOMS: NERVOUS/ANXIOUS: 1

## 2024-01-16 ASSESSMENT — PATIENT HEALTH QUESTIONNAIRE - PHQ9
10. IF YOU CHECKED OFF ANY PROBLEMS, HOW DIFFICULT HAVE THESE PROBLEMS MADE IT FOR YOU TO DO YOUR WORK, TAKE CARE OF THINGS AT HOME, OR GET ALONG WITH OTHER PEOPLE: SOMEWHAT DIFFICULT
SUM OF ALL RESPONSES TO PHQ QUESTIONS 1-9: 6
SUM OF ALL RESPONSES TO PHQ QUESTIONS 1-9: 6

## 2024-01-16 NOTE — PROGRESS NOTES
Assessment & Plan     (F41.1) PHILIP (generalized anxiety disorder)  (primary encounter diagnosis)  Comment:   Plan: propranolol (INDERAL) 20 MG tablet,         escitalopram (LEXAPRO) 10 MG tablet        Improving, dose increased, f/unit(s) in 6 weeks    (G43.109) Migraine with aura and without status migrainosus, not intractable  Comment:   Plan: rizatriptan (MAXALT) 5 MG tablet        Counseled on med use for abortive therapy. If still with multiple migraines per month, consider propranolol as preventative therapy and increase dose    (R55) Syncope, unspecified syncope type  Comment:   Plan: will find neurology and cardiology in her system and refer    (R20.0,  R20.2) Numbness and tingling of both legs  Comment:   Plan:                  Tiffany Oropeza NP  [facility]    Curtis Burrell is a 33 year old, presenting for the following health issues: following up after starting on Lexapro 11/2023, also wants to discuss migraine prevention medication, continues to have leg numbness and passing out - needs to find cardiologist and neurologist in network      Has been on Lexapro for 6 weeks, helping with symptoms, would like to slowly increase dose    Using propranolol prn, uses daily plus twice a day twice a week for anxiety, needs refills    Has had migraines since a child.  She gets them multiple times per month and they last 5-7 days. Has aura, n/v, sensitivity to light  Improved when she quit dairy and gluten but then she had hysterectomy(ovaries preserved) with salpingectomy, for heavy periods.  and migraines resolved.     She discovered her cardiologist and neurologist are not in our system, she will find out where her insurance ([employer]) will direct her and let us know where to send referral. Holter monitor was normal (and she had symptoms during it) .  Recheck Medication, Anxiety, Depression, Numbness, and Syncope        1/16/2024    11:44 AM   Additional Questions   Roomed by  "alfonso clarke   Accompanied by self       Anxiety    History of Present Illness       Mental Health Follow-up:  Patient presents to follow-up on Depression & Anxiety.Patient's depression since last visit has been:  Better  The patient is not having other symptoms associated with depression.  Patient's anxiety since last visit has been:  Medium  The patient is not having other symptoms associated with anxiety.  Any significant life events: No  Patient is feeling anxious or having panic attacks.  Patient has no concerns about alcohol or drug use.    Headaches:   Since the patient's last clinic visit, headaches are: worsened  The patient is getting headaches:  Multiple time a month lasting 3 to 7 days  She is not able to do normal daily activities when she has a migraine.  The patient is taking the following rescue/relief medications:  Tylenol   Patient states \"I get only a small amount of relief\" from the rescue/relief medications.   The patient is taking the following medications to prevent migraines:  No medications to prevent migraines  In the past 4 weeks, the patient has gone to an Urgent Care or Emergency Room 0 times times due to headaches.    She eats 2-3 servings of fruits and vegetables daily.She consumes 0 sweetened beverage(s) daily.She exercises with enough effort to increase her heart rate 20 to 29 minutes per day.  She exercises with enough effort to increase her heart rate 5 days per week.   She is taking medications regularly.     PATIENT HEALTH QUESTIONNAIRE-9 (PHQ - 9)    Over the last 2 weeks, how often have you been bothered by any of the following problems?    1. Little interest or pleasure in doing things -  Several days   2. Feeling down, depressed, or hopeless -  Several days   3. Trouble falling or staying asleep, or sleeping too much - More than half the days   4. Feeling tired or having little energy -  Several days   5. Poor appetite or overeating -  Not at all   6. Feeling bad about yourself - " "or that you are a failure or have let yourself or your family down -  Not at all   7. Trouble concentrating on things, such as reading the newspaper or watching television - Several days   8. Moving or speaking so slowly that other people could have noticed? Or the opposite - being so fidgety or restless that you have been moving around a lot more than usual Not at all   9. Thoughts that you would be better off dead or of hurting  yourself in some way Not at all   Total Score: 6     If you checked off any problems, how difficult have these problems made it for you to do your work, take care of things at home, or get along with other people?      Developed by Daria Morgan, Marissa Rosa, Gabriele Eldridge and colleagues, with an educational eder from [employer] No permission required to reproduce, translate, display or distribute. permission required to reproduce, translate, display or distribute.     GAD7 score: 9           Review of Systems   Psychiatric/Behavioral:  The patient is nervous/anxious.             Objective    /64 (BP Location: Right arm, Patient Position: Sitting)   Pulse 74   Temp 98.2  F (36.8  C)   Ht 1.645 m (5' 4.75\")   Wt 62.6 kg (138 lb 1.6 oz)   LMP 10/18/2013   SpO2 99%   Breastfeeding No   BMI 23.16 kg/m    Body mass index is 23.16 kg/m .  Physical Exam   GENERAL: healthy, alert and no distress  NECK: no adenopathy, no asymmetry, masses, or scars and thyroid normal to palpation  RESP: lungs clear to auscultation - no rales, rhonchi or wheezes  CV: regular rate and rhythm, normal S1 S2, no S3 or S4, no murmur, click or rub, no peripheral edema and peripheral pulses strong  MS: no gross musculoskeletal defects noted, no edema                      "

## 2024-02-14 DIAGNOSIS — F41.1 GAD (GENERALIZED ANXIETY DISORDER): ICD-10-CM

## 2024-02-14 RX ORDER — BUSPIRONE HYDROCHLORIDE 10 MG/1
TABLET ORAL
Qty: 90 TABLET | Refills: 11 | Status: SHIPPED | OUTPATIENT
Start: 2024-02-14 | End: 2024-08-29

## 2024-06-22 ENCOUNTER — HEALTH MAINTENANCE LETTER (OUTPATIENT)
Age: 34
End: 2024-06-22

## 2024-07-09 ENCOUNTER — ANCILLARY PROCEDURE (OUTPATIENT)
Dept: GENERAL RADIOLOGY | Facility: CLINIC | Age: 34
End: 2024-07-09
Attending: NURSE PRACTITIONER
Payer: COMMERCIAL

## 2024-07-09 ENCOUNTER — OFFICE VISIT (OUTPATIENT)
Dept: FAMILY MEDICINE | Facility: CLINIC | Age: 34
End: 2024-07-09
Payer: COMMERCIAL

## 2024-07-09 VITALS
BODY MASS INDEX: 24.06 KG/M2 | DIASTOLIC BLOOD PRESSURE: 70 MMHG | OXYGEN SATURATION: 98 % | SYSTOLIC BLOOD PRESSURE: 112 MMHG | WEIGHT: 144.4 LBS | HEIGHT: 65 IN | TEMPERATURE: 98.2 F | HEART RATE: 86 BPM

## 2024-07-09 DIAGNOSIS — R20.0 NUMBNESS AND TINGLING OF BOTH LEGS: ICD-10-CM

## 2024-07-09 DIAGNOSIS — M54.9 ACUTE BACK PAIN, UNSPECIFIED BACK LOCATION, UNSPECIFIED BACK PAIN LATERALITY: Primary | ICD-10-CM

## 2024-07-09 DIAGNOSIS — M54.2 NECK PAIN: ICD-10-CM

## 2024-07-09 DIAGNOSIS — R20.0 NUMBNESS AND TINGLING IN BOTH HANDS: ICD-10-CM

## 2024-07-09 DIAGNOSIS — R20.2 NUMBNESS AND TINGLING IN BOTH HANDS: ICD-10-CM

## 2024-07-09 DIAGNOSIS — R20.2 NUMBNESS AND TINGLING OF BOTH LEGS: ICD-10-CM

## 2024-07-09 DIAGNOSIS — R55 SYNCOPE, UNSPECIFIED SYNCOPE TYPE: ICD-10-CM

## 2024-07-09 DIAGNOSIS — G43.109 MIGRAINE WITH AURA AND WITHOUT STATUS MIGRAINOSUS, NOT INTRACTABLE: ICD-10-CM

## 2024-07-09 DIAGNOSIS — M25.511 ACUTE PAIN OF RIGHT SHOULDER: ICD-10-CM

## 2024-07-09 PROCEDURE — G2211 COMPLEX E/M VISIT ADD ON: HCPCS | Performed by: NURSE PRACTITIONER

## 2024-07-09 PROCEDURE — 73030 X-RAY EXAM OF SHOULDER: CPT | Mod: TC | Performed by: STUDENT IN AN ORGANIZED HEALTH CARE EDUCATION/TRAINING PROGRAM

## 2024-07-09 PROCEDURE — 99214 OFFICE O/P EST MOD 30 MIN: CPT | Performed by: NURSE PRACTITIONER

## 2024-07-09 PROCEDURE — 72070 X-RAY EXAM THORAC SPINE 2VWS: CPT | Mod: TC | Performed by: RADIOLOGY

## 2024-07-09 PROCEDURE — 72040 X-RAY EXAM NECK SPINE 2-3 VW: CPT | Mod: TC | Performed by: RADIOLOGY

## 2024-07-09 RX ORDER — GABAPENTIN 100 MG/1
100 CAPSULE ORAL 2 TIMES DAILY
Qty: 60 CAPSULE | Refills: 1 | Status: SHIPPED | OUTPATIENT
Start: 2024-07-09 | End: 2024-08-05

## 2024-07-09 RX ORDER — RIZATRIPTAN BENZOATE 5 MG/1
5 TABLET ORAL
Qty: 9 TABLET | Refills: 3 | Status: SHIPPED | OUTPATIENT
Start: 2024-07-09

## 2024-07-09 NOTE — PROGRESS NOTES
Assessment & Plan     (G43.109) Migraine with aura and without status migrainosus, not intractable  (primary encounter diagnosis)  Comment:   Plan: rizatriptan (MAXALT) 5 MG tablet          (M54.2) Neck pain  Comment:   Plan: XR Cervical Spine 2/3 Views, gabapentin         (NEURONTIN) 100 MG capsule            (M54.9) Acute back pain, unspecified back location, unspecified back pain laterality  Comment:   Plan: XR Thoracic Spine 2 Views            (M25.511) Acute pain of right shoulder  Comment:   Plan: XR Shoulder Right G/E 3 Views            (R55) Syncope, unspecified syncope type  Comment:   Plan: Adult Cardiology Eval  Referral, Adult        Neurology  Referral, ZIO PATCH MAIL         OUT, Adult Cardiology Eval  Referral            (R20.0,  R20.2) Numbness and tingling of both legs  Comment:   Plan: Adult Cardiology Eval  Referral, Adult        Neurology  Referral, gabapentin         (NEURONTIN) 100 MG capsule            (R20.0,  R20.2) Numbness and tingling in both hands  Comment:   Plan: Adult Cardiology Eval  Referral, Adult        Neurology  Referral, gabapentin         (NEURONTIN) 100 MG capsule      The longitudinal plan of care for the diagnosis(es)/condition(s) as documented were addressed during this visit. Due to the added complexity in care, I will continue to support Ashanti in the subsequent management and with ongoing continuity of care.        Starting on gabapentin for recurrent tingling in arms/legs but this may help migraine prevention as well. She has not been able to seek out cardiology or neurology in her network. She does NOT want to use [facility] or [facility] and [place] so [place] in [place].  I placed   -cards referral for [facility] AND to [facility] as well as Zio patch to start the cardiac eval for etiology of repeated syncope/falls as well as tingling with limited ROM of  arms/legs  that has been intermit for about 3 years.  her shoulder eval is worrisome for left shoulder drop and very limited Rom of right shoulder with onset 8 hours ago. Declines pain med other than gabapentin. She works as PCA in patient homes and feels she is very capable of working as she has worked with these episodes in the past  -Neurology--prefers to go to [facility] for this, order written    I emphasized to her that her symptoms are worrisome for a serious condition--heart rhythm abnormality, seizure, MS etc-- and asked her to reach out to me if she is not successful in setting up these appointments    The longitudinal plan of care for the diagnosis(es)/condition(s) as documented were addressed during this visit. Due to the added complexity in care, I will continue to support Ashanti in the subsequent management and with ongoing continuity of care.              Curtis Burrell is a 33 year old, presenting for the following health issues: patient woke around 3AM with severe neck and back pain, was unable to move, can't lift her arms fully, can't turn her head.  Things have improved since this morning. This is not Unusual for her to have episodes of numbness and tingling down her arms and legs.  She is also suffered from multiple falls related to syncope sometimes in the shower sometimes not.  I seen her for this in the past and she had concerns about insurance coverage and she had plan to find a cardiologist and neurologist that she knew was in her plan and let me know so referrals could be faxed there and she just has not had time to make that happen yet.  She last fell 2 months ago but states she did not hit her head that she fainted in the shower again.  Usually she has a chair in the shower but she had not done at this time.  Denies injury, denies bruising, today she has exquisite pain in her neck and back but does not think it is related to the fall but is agreeable to x-ray.  Never has any chest pain  "or shortness of breath these episodes  Neck Pain and Back Pain        7/9/2024     9:47 AM   Additional Questions   Roomed by alfonso clarke   Accompanied by self     History of Present Illness       Reason for visit:  Neck and back pain  Symptom onset:  Today  Symptoms include:  Stiffness, pain  Symptom intensity:  Moderate  Symptom progression:  Staying the same  Had these symptoms before:  No  What makes it worse:  Just started this morning, sitting or moving arms or head is painfil  What makes it better:  No    She eats 2-3 servings of fruits and vegetables daily.She consumes 0 sweetened beverage(s) daily.She exercises with enough effort to increase her heart rate 60 or more minutes per day.  She exercises with enough effort to increase her heart rate 3 or less days per week.   She is taking medications regularly.                 Objective    /70 (BP Location: Right arm, Patient Position: Sitting)   Pulse 86   Temp 98.2  F (36.8  C)   Ht 1.645 m (5' 4.75\")   Wt 65.5 kg (144 lb 6.4 oz)   LMP 10/18/2013   SpO2 98%   Breastfeeding No   BMI 24.22 kg/m    Body mass index is 24.22 kg/m .  Physical Exam   Her right shoulder is lifted significantly compared to her left.  She can move it down but states it is more comfortable to keep it raised.  She cannot bring her right arm past shoulder height in any direction.  She has good range of motion of the left arm.  She is very tender with palpation over the cervical spine and upper thoracic spine.  Heart is regular lungs are clear.  Skin is warm pink and dry there is no bruising speech is clear            Signed Electronically by: Tiffany Oropeza NP    "

## 2024-07-20 ENCOUNTER — OFFICE VISIT (OUTPATIENT)
Dept: URGENT CARE | Facility: URGENT CARE | Age: 34
End: 2024-07-20
Payer: COMMERCIAL

## 2024-07-20 VITALS
SYSTOLIC BLOOD PRESSURE: 124 MMHG | OXYGEN SATURATION: 96 % | WEIGHT: 140.2 LBS | HEART RATE: 79 BPM | TEMPERATURE: 98.6 F | DIASTOLIC BLOOD PRESSURE: 70 MMHG | RESPIRATION RATE: 12 BRPM | BODY MASS INDEX: 23.51 KG/M2

## 2024-07-20 DIAGNOSIS — N89.8 VAGINAL ITCHING: Primary | ICD-10-CM

## 2024-07-20 LAB
CLUE CELLS: ABNORMAL
TRICHOMONAS, WET PREP: ABNORMAL
WBC'S/HIGH POWER FIELD, WET PREP: ABNORMAL
YEAST, WET PREP: ABNORMAL

## 2024-07-20 PROCEDURE — 87210 SMEAR WET MOUNT SALINE/INK: CPT

## 2024-07-20 PROCEDURE — 99213 OFFICE O/P EST LOW 20 MIN: CPT | Performed by: PHYSICIAN ASSISTANT

## 2024-07-20 RX ORDER — FLUCONAZOLE 150 MG/1
150 TABLET ORAL ONCE
Qty: 1 TABLET | Refills: 0 | Status: SHIPPED | OUTPATIENT
Start: 2024-07-20 | End: 2024-07-20

## 2024-07-20 NOTE — PROGRESS NOTES
Assessment & Plan     Vaginal itching  Negative for yeast infection on wet prep, no BV. Her sx do sound more yeast related. Pt would like to try emperic tx and diflucan given. She denies concern for STI.   Would recommend avoiding contact irritants which she already does.   Recommend follow-up if persistent or not improving with diflucan. Pt agreeable with plan.    - Wet prep - Clinic Collect  - fluconazole (DIFLUCAN) 150 MG tablet  Dispense: 1 tablet; Refill: 0           [facility] [place]    Curtis Burrell is a 33 year old female who presents to clinic today for the following health issues:  Chief Complaint   Patient presents with    Vaginal Problem     X 1-2 days, itching, yellow discharge.          7/20/2024     1:05 PM   Additional Questions   Roomed by Shaye MAGANA CMA   Accompanied by None     HPI    Thicker discharge in 2020, so normally no discharge.   This morning more yellow, itchy, swollen. Took an apple cider vinegar bath    No abx or steroids. No odor.    No STI concern.    No pain with intercourse.  No dysuria, frequency, urgency.      Review of Systems  Constitutional, HEENT, cardiovascular, pulmonary, gi and gu systems are negative, except as otherwise noted.      Objective    /70   Pulse 79   Temp 98.6  F (37  C)   Resp 12   Wt 63.6 kg (140 lb 3.2 oz)   LMP 10/21/2020 (Within Days)   SpO2 96%   BMI 23.51 kg/m    Physical Exam   Results for orders placed or performed in visit on 07/20/24   Wet prep - Clinic Collect     Status: Abnormal    Specimen: Vagina; Swab   Result Value Ref Range    Trichomonas Absent Absent    Yeast Absent Absent    Clue Cells Absent Absent    WBCs/high power field 4+ (A) None

## 2024-07-27 ENCOUNTER — NURSE TRIAGE (OUTPATIENT)
Dept: NURSING | Facility: CLINIC | Age: 34
End: 2024-07-27
Payer: COMMERCIAL

## 2024-07-27 NOTE — TELEPHONE ENCOUNTER
Asking about a decongestant she can take that would be safe with the other medications she is taking.  I suggested she call a pharmacy and ask the pharmacist.  Caller stated understanding and agreement.  Swapna COATS RN [facility]

## 2024-07-30 ENCOUNTER — MYC MEDICAL ADVICE (OUTPATIENT)
Dept: FAMILY MEDICINE | Facility: CLINIC | Age: 34
End: 2024-07-30

## 2024-07-30 ENCOUNTER — ANCILLARY PROCEDURE (OUTPATIENT)
Dept: GENERAL RADIOLOGY | Facility: CLINIC | Age: 34
End: 2024-07-30
Attending: NURSE PRACTITIONER
Payer: COMMERCIAL

## 2024-07-30 ENCOUNTER — OFFICE VISIT (OUTPATIENT)
Dept: NEUROSURGERY | Facility: CLINIC | Age: 34
End: 2024-07-30
Attending: NURSE PRACTITIONER
Payer: COMMERCIAL

## 2024-07-30 VITALS
BODY MASS INDEX: 22.49 KG/M2 | SYSTOLIC BLOOD PRESSURE: 122 MMHG | WEIGHT: 135 LBS | HEIGHT: 65 IN | DIASTOLIC BLOOD PRESSURE: 81 MMHG | HEART RATE: 76 BPM

## 2024-07-30 DIAGNOSIS — R20.0 NUMBNESS AND TINGLING OF BOTH LEGS: ICD-10-CM

## 2024-07-30 DIAGNOSIS — M54.41 CHRONIC BILATERAL LOW BACK PAIN WITH BILATERAL SCIATICA: ICD-10-CM

## 2024-07-30 DIAGNOSIS — M54.42 CHRONIC BILATERAL LOW BACK PAIN WITH BILATERAL SCIATICA: ICD-10-CM

## 2024-07-30 DIAGNOSIS — M43.12 SPONDYLOLISTHESIS, CERVICAL REGION: ICD-10-CM

## 2024-07-30 DIAGNOSIS — R20.0 NUMBNESS AND TINGLING IN BOTH HANDS: ICD-10-CM

## 2024-07-30 DIAGNOSIS — M41.23 OTHER IDIOPATHIC SCOLIOSIS, CERVICOTHORACIC REGION: ICD-10-CM

## 2024-07-30 DIAGNOSIS — R20.2 NUMBNESS AND TINGLING OF RIGHT UPPER AND LOWER EXTREMITY: ICD-10-CM

## 2024-07-30 DIAGNOSIS — G89.29 CHRONIC BILATERAL LOW BACK PAIN WITH BILATERAL SCIATICA: ICD-10-CM

## 2024-07-30 DIAGNOSIS — M51.34 THORACIC DEGENERATIVE DISC DISEASE: ICD-10-CM

## 2024-07-30 DIAGNOSIS — R20.0 NUMBNESS AND TINGLING OF RIGHT UPPER AND LOWER EXTREMITY: ICD-10-CM

## 2024-07-30 DIAGNOSIS — M54.2 NECK PAIN: Primary | ICD-10-CM

## 2024-07-30 DIAGNOSIS — R20.2 NUMBNESS AND TINGLING OF BOTH LEGS: ICD-10-CM

## 2024-07-30 DIAGNOSIS — R20.2 NUMBNESS AND TINGLING IN BOTH HANDS: ICD-10-CM

## 2024-07-30 DIAGNOSIS — M54.2 NECK PAIN: ICD-10-CM

## 2024-07-30 PROCEDURE — 99205 OFFICE O/P NEW HI 60 MIN: CPT | Performed by: NURSE PRACTITIONER

## 2024-07-30 PROCEDURE — 72050 X-RAY EXAM NECK SPINE 4/5VWS: CPT | Mod: TC | Performed by: INTERNAL MEDICINE

## 2024-07-30 ASSESSMENT — PAIN SCALES - GENERAL: PAINLEVEL: MODERATE PAIN (4)

## 2024-07-30 NOTE — PATIENT INSTRUCTIONS
~Spine Center Scheduling #[phone].  ~Please call our [facility] Spine Nurse Navigation #[phone] with any questions or concerns about your treatment plan, if symptoms worsen and you would like to be seen urgently, or if you have problems controlling bladder and bowel function.  ~For any future flareups or new symptoms, recommend follow-up in clinic or contact the nurse navigator line.  ~Please note that any My Chart messages may take multiple days for a response due to the high volume of patients seen in clinic.  Anything sent Thursday night or after will be answered the following week when able, as Silvia Manuel CNP does not work in clinic on Fridays.   ~Silvia Manuel CNP is at the [facility] on Tuesdays, otherwise primarily at the [facility].       Imaging has been ordered. Radiology will call you to schedule. Please call below if you do not hear from them in the next couple of days.     [facility] Radiology Scheduling    Please call [phone] to schedule your image(s) (select option #1). There are 3 different locations near, see below.   There are imaging options for other locations as well, the imaging schedulers can help with other locations within [place].     [facility]  [address] [id number]    [facility] [place]  [address], [address]   [facility] [id number]    [facility]  [address] [address]
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
## 2024-07-31 NOTE — TELEPHONE ENCOUNTER
Please find out if patient was able to schedule with cardiology, if so when and where?  Also find out about the maricarmen patch. If that is mailed to her is there a delay with this? Not sure why she is asking about a time frame . She has leydi care I think. She has had multiple episodes of syncope. Thanks

## 2024-08-05 ENCOUNTER — TELEPHONE (OUTPATIENT)
Dept: FAMILY MEDICINE | Facility: CLINIC | Age: 34
End: 2024-08-05
Payer: COMMERCIAL

## 2024-08-05 RX ORDER — GABAPENTIN 100 MG/1
CAPSULE ORAL
Qty: 120 CAPSULE | Refills: 2 | Status: SHIPPED | OUTPATIENT
Start: 2024-08-05

## 2024-08-05 NOTE — TELEPHONE ENCOUNTER
I spoke with Tawana GONZALEZ and she was able to check and see if the VALERY Patch was mailed to patient. It was not. Tawana reached out the [employer] rep and the reason it was not mailed was because it was ordered as a future order, so not active in the system. They will mail out tomorrow.   I re-[facility]ed [facility] [phone]. Confirmation was received.

## 2024-08-05 NOTE — TELEPHONE ENCOUNTER
Please try and reach her again and clarify if she has returned the ZIO patch and if she has appointment with cardiology.  I think she needs both as quickly as possible. Thanks.

## 2024-08-05 NOTE — TELEPHONE ENCOUNTER
Faxed cardiology referral to [facility] at [phone]. Confirmation was received.

## 2024-08-05 NOTE — TELEPHONE ENCOUNTER
She was referred to cardiology on 7/9, please make sure she has the phone number to contact them to schedule appointment after her Zio yovany is done,  as it sounds like she missed their call

## 2024-08-05 NOTE — TELEPHONE ENCOUNTER
Spoke with patient. She has not been seen by cardiology. She also states that she never received a call from referrals either. Patient has not received the Lucía Patch, but says she has not checked her mail for several days. She will check when she gets home today.    Has MRI scheduled 08/22/2024  Has EMG scheduled 10/2024 - this was the soonest appt available

## 2024-08-06 ENCOUNTER — TELEPHONE (OUTPATIENT)
Dept: FAMILY MEDICINE | Facility: CLINIC | Age: 34
End: 2024-08-06
Payer: COMMERCIAL

## 2024-08-06 NOTE — TELEPHONE ENCOUNTER
Spoke with patient to clarify the plan. ZIO patch should be sent out in the mail today. Asked that she reach out if she does not receive within a week. Also, let her know that the Cardiology referral was faxed to [facility]. She has not heard from them but I did encourage her to reach out to them. Patient will let us know if she has any problems with scheduling.

## 2024-08-12 NOTE — TELEPHONE ENCOUNTER
I would like to touch base with her. I would like an in clinic visit to get a heart rate but if she can only do virtual, please schedule virtual.  Holding off on the med refill till I talk to her.

## 2024-08-12 NOTE — TELEPHONE ENCOUNTER
Please see My Chart Message:    Patient requesting a refill of Propranolol  2.   Patient requesting an INCREASE in DOSE   Pt reports self increased to 3-4 daily.    Last office visit with PCP: 7/9/2024

## 2024-08-12 NOTE — TELEPHONE ENCOUNTER
Called and left detailed message on patient identified VM for patient to schedule appointment.    Please assist patient with scheduling.

## 2024-08-15 DIAGNOSIS — F41.1 GAD (GENERALIZED ANXIETY DISORDER): ICD-10-CM

## 2024-08-16 RX ORDER — ESCITALOPRAM OXALATE 10 MG/1
20 TABLET ORAL DAILY
Qty: 60 TABLET | Refills: 0 | Status: SHIPPED | OUTPATIENT
Start: 2024-08-16 | End: 2024-09-18

## 2024-08-19 ENCOUNTER — MYC MEDICAL ADVICE (OUTPATIENT)
Dept: NEUROSURGERY | Facility: CLINIC | Age: 34
End: 2024-08-19
Payer: COMMERCIAL

## 2024-08-19 DIAGNOSIS — M41.23 OTHER IDIOPATHIC SCOLIOSIS, CERVICOTHORACIC REGION: ICD-10-CM

## 2024-08-19 DIAGNOSIS — M51.34 THORACIC DEGENERATIVE DISC DISEASE: ICD-10-CM

## 2024-08-19 DIAGNOSIS — R20.2 NUMBNESS AND TINGLING OF RIGHT UPPER AND LOWER EXTREMITY: ICD-10-CM

## 2024-08-19 DIAGNOSIS — M54.41 CHRONIC BILATERAL LOW BACK PAIN WITH BILATERAL SCIATICA: ICD-10-CM

## 2024-08-19 DIAGNOSIS — M43.12 SPONDYLOLISTHESIS, CERVICAL REGION: ICD-10-CM

## 2024-08-19 DIAGNOSIS — G89.29 CHRONIC BILATERAL LOW BACK PAIN WITH BILATERAL SCIATICA: ICD-10-CM

## 2024-08-19 DIAGNOSIS — R20.0 NUMBNESS AND TINGLING OF RIGHT UPPER AND LOWER EXTREMITY: ICD-10-CM

## 2024-08-19 DIAGNOSIS — M54.42 CHRONIC BILATERAL LOW BACK PAIN WITH BILATERAL SCIATICA: ICD-10-CM

## 2024-08-19 DIAGNOSIS — M54.2 NECK PAIN: Primary | ICD-10-CM

## 2024-08-29 ENCOUNTER — VIRTUAL VISIT (OUTPATIENT)
Dept: FAMILY MEDICINE | Facility: CLINIC | Age: 34
End: 2024-08-29
Payer: COMMERCIAL

## 2024-08-29 DIAGNOSIS — R20.2 NUMBNESS AND TINGLING OF BOTH LEGS: ICD-10-CM

## 2024-08-29 DIAGNOSIS — R20.0 NUMBNESS AND TINGLING OF BOTH LEGS: ICD-10-CM

## 2024-08-29 DIAGNOSIS — R55 SYNCOPE, UNSPECIFIED SYNCOPE TYPE: Primary | ICD-10-CM

## 2024-08-29 DIAGNOSIS — F41.1 GAD (GENERALIZED ANXIETY DISORDER): ICD-10-CM

## 2024-08-29 PROCEDURE — 99214 OFFICE O/P EST MOD 30 MIN: CPT | Mod: 95 | Performed by: NURSE PRACTITIONER

## 2024-08-29 PROCEDURE — G2211 COMPLEX E/M VISIT ADD ON: HCPCS | Mod: 95 | Performed by: NURSE PRACTITIONER

## 2024-08-29 RX ORDER — PROPRANOLOL HYDROCHLORIDE 20 MG/1
20 TABLET ORAL 4 TIMES DAILY PRN
Qty: 120 TABLET | Refills: 3 | Status: SHIPPED | OUTPATIENT
Start: 2024-08-29

## 2024-08-29 RX ORDER — BUSPIRONE HYDROCHLORIDE 10 MG/1
TABLET ORAL
Qty: 90 TABLET | Refills: 11 | Status: SHIPPED | OUTPATIENT
Start: 2024-08-29

## 2024-08-29 ASSESSMENT — ANXIETY QUESTIONNAIRES
3. WORRYING TOO MUCH ABOUT DIFFERENT THINGS: SEVERAL DAYS
7. FEELING AFRAID AS IF SOMETHING AWFUL MIGHT HAPPEN: SEVERAL DAYS
4. TROUBLE RELAXING: NEARLY EVERY DAY
GAD7 TOTAL SCORE: 9
1. FEELING NERVOUS, ANXIOUS, OR ON EDGE: SEVERAL DAYS
2. NOT BEING ABLE TO STOP OR CONTROL WORRYING: SEVERAL DAYS
GAD7 TOTAL SCORE: 9
6. BECOMING EASILY ANNOYED OR IRRITABLE: SEVERAL DAYS
8. IF YOU CHECKED OFF ANY PROBLEMS, HOW DIFFICULT HAVE THESE MADE IT FOR YOU TO DO YOUR WORK, TAKE CARE OF THINGS AT HOME, OR GET ALONG WITH OTHER PEOPLE?: SOMEWHAT DIFFICULT
IF YOU CHECKED OFF ANY PROBLEMS ON THIS QUESTIONNAIRE, HOW DIFFICULT HAVE THESE PROBLEMS MADE IT FOR YOU TO DO YOUR WORK, TAKE CARE OF THINGS AT HOME, OR GET ALONG WITH OTHER PEOPLE: SOMEWHAT DIFFICULT
5. BEING SO RESTLESS THAT IT IS HARD TO SIT STILL: SEVERAL DAYS
GAD7 TOTAL SCORE: 9
7. FEELING AFRAID AS IF SOMETHING AWFUL MIGHT HAPPEN: SEVERAL DAYS

## 2024-08-29 ASSESSMENT — PATIENT HEALTH QUESTIONNAIRE - PHQ9
SUM OF ALL RESPONSES TO PHQ QUESTIONS 1-9: 11
SUM OF ALL RESPONSES TO PHQ QUESTIONS 1-9: 11
10. IF YOU CHECKED OFF ANY PROBLEMS, HOW DIFFICULT HAVE THESE PROBLEMS MADE IT FOR YOU TO DO YOUR WORK, TAKE CARE OF THINGS AT HOME, OR GET ALONG WITH OTHER PEOPLE: SOMEWHAT DIFFICULT
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
## 2024-09-17 DIAGNOSIS — F41.1 GAD (GENERALIZED ANXIETY DISORDER): ICD-10-CM

## 2024-09-18 RX ORDER — ESCITALOPRAM OXALATE 10 MG/1
20 TABLET ORAL DAILY
Qty: 60 TABLET | Refills: 5 | Status: SHIPPED | OUTPATIENT
Start: 2024-09-18

## 2024-10-14 ENCOUNTER — OFFICE VISIT (OUTPATIENT)
Dept: NEUROLOGY | Facility: CLINIC | Age: 34
End: 2024-10-14
Attending: NURSE PRACTITIONER
Payer: COMMERCIAL

## 2024-10-14 DIAGNOSIS — R20.0 NUMBNESS AND TINGLING OF RIGHT UPPER AND LOWER EXTREMITY: Primary | ICD-10-CM

## 2024-10-14 DIAGNOSIS — R20.2 NUMBNESS AND TINGLING OF RIGHT UPPER AND LOWER EXTREMITY: Primary | ICD-10-CM

## 2024-10-14 PROCEDURE — 95910 NRV CNDJ TEST 7-8 STUDIES: CPT | Performed by: INTERNAL MEDICINE

## 2024-10-14 PROCEDURE — 95885 MUSC TST DONE W/NERV TST LIM: CPT | Performed by: INTERNAL MEDICINE

## 2024-10-14 NOTE — PROGRESS NOTES
[facility]  Electrodiagnostic Laboratory                 Department of Neurology                                                                                                         Test Date:  10/14/2024    Patient: Ashanti Narayan : 1990 Physician: Ag Ann MD   Sex: Female AGE: 33 year Ref Phys:    ID#: [id number]   Technician: Kristy Behling     History and Examination:  Patient is a 34 yo female who presents for evaluation of right sided paresthesias. EMG ordered to evaluate for focal neuropathy or radiculopathy.    Techniques:  Motor conduction studies were done with surface recording electrodes. Sensory conduction studies were performed with surface electrodes, unless indicated otherwise by (n), designating the use of subdermal recording electrodes. Temperature was monitored and recorded throughout the study. Upper extremities were maintained at a temperature of 32 degrees Centigrade or higher.  EMG was done with a concentric needle electrode.     Results:  All nerve conduction studies (as indicated in the following tables) were within normal limits.      All F Wave latencies were within normal limits.      All examined muscles (as indicated in the following table) showed no evidence of electrical instability.        Interpretation:  This is a normal study. In particular, there is no electrophysiologic evidence of focal neuropathy or radiculopathy in the right upper or lower extremity.     ___________________________  Ag nAn MD        Nerve Conduction Studies  Motor Sites      Latency Neg. Amp Neg. Amp Diff Segment Distance Velocity Neg. Dur Neg Area Diff Temperature Comment   Site (ms) Norm (mV) Norm (%)  cm m/s Norm (ms) (%) ( C)    Right Fibular (EDB) Motor   Ankle 3.1  < 6.0 5.6 -  Ankle-EDB 8   5.8  30.5    Bel Fibular Head 10.6 - 4.9 - -13 Bel Fibular Head-Ankle 38 51  > 38 6.3 -9 30.5    Pop Fossa 11.6 - 4.3 - -12 Pop Fossa-Bel Fibular Head 6  60  > 38 5.9 -11 30.7    Right Median (APB) Motor   Wrist 3.4  < 4.4 8.0  > 5.0  Wrist-APB 8   5.1  31.8    Elbow 7.1 - 8.2  > 5.0 3 Elbow-Wrist 19 51  > 48 5.5 4 31.8    Right Tibial (AHB) Motor   Ankle 2.7  < 6.5 12.6  > 5.0  Ankle-AH 8   5.4  30.8    Knee 11.9 - 11.0 - -13 Knee-Ankle 40 43  > 38 5.2 0 30.9    Right Ulnar (ADM) Motor   Wrist 2.5  < 3.5 8.6  > 5.0  Wrist-ADM 8   5.6  31.7    Below Elbow 5.8 - 7.5 - -13 Below Elbow-Wrist 20 61  > 48 5.2 -17 31.6    Above Elbow 7.4 - 7.3 - -3 Above Elbow-Below Elbow 9 56  > 48 5.2 4 31.6    Right Ulnar (FDI) Motor   Wrist 3.2 - 16.4 -      3.7  31.5    Below Elbow 6.6 - 14.6 - -11 Below Elbow-Wrist 20 59  > 48 3.7 -13 31.5    Above Elbow 8.0 - 14.2 - -3 Above Elbow-Below Elbow 8 57  > 48 3.7 -2 31.6      Sensory Sites      Onset Lat Peak Lat Amp (O-P) Amp (P-P) Segment Distance Velocity Temperature Comment   Site ms (ms)  V Norm ( V)  cm m/s Norm ( C)    Right Median Sensory   Wrist-Dig II 2.3 3.0 43  > 10 89 Wrist-Dig II 14 61  > 48 31.4    Right Median-Ulnar Palmar Sensory        Median   Palm-Wrist 1.33 1.80 99 - 122 Palm-Wrist 8 60 - 31.6         Ulnar   Palm-Wrist 1.28 1.80 18 - 42 Palm-Wrist 8 63 - 31.7    Right Superficial Fibular Sensory   Lower Leg-Ankle 2.4 3.1 14  > 3 16 Lower Leg-Ankle 12.5 52 - 28.8    Right Sural Sensory   Calf-Lat Malleolus 2.9 3.5 17  > 5 17 Calf-Lat Malleolus 14 48  > 38 30.2    Right Ulnar Sensory   Wrist-Dig V 2.2 2.8 37  > 8 140 Wrist-Dig V 12.5 57  > 48 31.7      Inter-Nerve Comparisons     Nerve 1 Value 1 Nerve 2 Value 2 Parameter Result Normal   Sensory Sites   R Median Palm-Wrist 1.80 ms R Ulnar Palm-Wrist 1.80 ms Peak Lat Diff 0 ms <0.40     F Wave Studies     Min-F Max-F Dispersion Persistence Mean-F F-Norm L-R Mean-F L-R Mean-F Norm F/M Ratio F-M Lat (ms)   Right Tibial (Abd Hallucis)  30.9  C   44.06 50.47 6.41 100.00 46.41 <61  <5.7 2.47 40.47   Right Ulnar (Abd Dig Min)  31.5  C   26.02 29.45 3.43 100.00 27.38 <36  <2.5  1.44 23.52       Electromyography     Side Muscle Ins Act Fibs/PSW Fasc HF Amp Dur Poly Recrt Int Pat   Right Deltoid Nml None Nml 0 Nml Nml 0 Nml Nml   Right Biceps Nml None Nml 0 Nml Nml 0 Nml Nml   Right Triceps Nml None Nml 0 Nml Nml 0 Nml Nml   Right FDI Nml None Nml 0 Nml Nml 0 Nml Nml   Right Tib ant Nml None Nml 0 Nml Nml 0 Nml Nml   Right Gastroc Nml None Nml 0 Nml Nml 0 Nml Nml   Right Vastus Med Nml None Nml 0 Nml Nml 0 Nml Nml         NCS Waveforms:    Motor                  Sensory

## 2024-10-14 NOTE — LETTER
10/14/2024       RE: Ashanti Narayan  [address]  [facility] [address]     Dear Colleague,    Thank you for referring your patient, Ashanti Narayan, to the [facility] [place] at [facility]. Please see a copy of my visit note below.                        [facility]  Electrodiagnostic Laboratory                 Department of Neurology                                                                                                         Test Date:  10/14/2024    Patient: Ashanti Narayan : 1990 Physician: Ag Ann MD   Sex: Female AGE: 33 year Ref Phys:    ID#: [id number]   Technician: Kristy Behling     History and Examination:  Patient is a 34 yo female who presents for evaluation of right sided paresthesias. EMG ordered to evaluate for focal neuropathy or radiculopathy.    Techniques:  Motor conduction studies were done with surface recording electrodes. Sensory conduction studies were performed with surface electrodes, unless indicated otherwise by (n), designating the use of subdermal recording electrodes. Temperature was monitored and recorded throughout the study. Upper extremities were maintained at a temperature of 32 degrees Centigrade or higher.  EMG was done with a concentric needle electrode.     Results:  All nerve conduction studies (as indicated in the following tables) were within normal limits.      All F Wave latencies were within normal limits.      All examined muscles (as indicated in the following table) showed no evidence of electrical instability.        Interpretation:  This is a normal study. In particular, there is no electrophysiologic evidence of focal neuropathy or radiculopathy in the right upper or lower extremity.     ___________________________  Ag Ann MD        Nerve Conduction Studies  Motor Sites      Latency Neg. Amp Neg. Amp Diff Segment Distance Velocity  Neg. Dur Neg Area Diff Temperature Comment   Site (ms) Norm (mV) Norm (%)  cm m/s Norm (ms) (%) ( C)    Right Fibular (EDB) Motor   Ankle 3.1  < 6.0 5.6 -  Ankle-EDB 8   5.8  30.5    Bel Fibular Head 10.6 - 4.9 - -13 Bel Fibular Head-Ankle 38 51  > 38 6.3 -9 30.5    Pop Fossa 11.6 - 4.3 - -12 Pop Fossa-Bel Fibular Head 6 60  > 38 5.9 -11 30.7    Right Median (APB) Motor   Wrist 3.4  < 4.4 8.0  > 5.0  Wrist-APB 8   5.1  31.8    Elbow 7.1 - 8.2  > 5.0 3 Elbow-Wrist 19 51  > 48 5.5 4 31.8    Right Tibial (AHB) Motor   Ankle 2.7  < 6.5 12.6  > 5.0  Ankle-AH 8   5.4  30.8    Knee 11.9 - 11.0 - -13 Knee-Ankle 40 43  > 38 5.2 0 30.9    Right Ulnar (ADM) Motor   Wrist 2.5  < 3.5 8.6  > 5.0  Wrist-ADM 8   5.6  31.7    Below Elbow 5.8 - 7.5 - -13 Below Elbow-Wrist 20 61  > 48 5.2 -17 31.6    Above Elbow 7.4 - 7.3 - -3 Above Elbow-Below Elbow 9 56  > 48 5.2 4 31.6    Right Ulnar (FDI) Motor   Wrist 3.2 - 16.4 -      3.7  31.5    Below Elbow 6.6 - 14.6 - -11 Below Elbow-Wrist 20 59  > 48 3.7 -13 31.5    Above Elbow 8.0 - 14.2 - -3 Above Elbow-Below Elbow 8 57  > 48 3.7 -2 31.6      Sensory Sites      Onset Lat Peak Lat Amp (O-P) Amp (P-P) Segment Distance Velocity Temperature Comment   Site ms (ms)  V Norm ( V)  cm m/s Norm ( C)    Right Median Sensory   Wrist-Dig II 2.3 3.0 43  > 10 89 Wrist-Dig II 14 61  > 48 31.4    Right Median-Ulnar Palmar Sensory        Median   Palm-Wrist 1.33 1.80 99 - 122 Palm-Wrist 8 60 - 31.6         Ulnar   Palm-Wrist 1.28 1.80 18 - 42 Palm-Wrist 8 63 - 31.7    Right Superficial Fibular Sensory   Lower Leg-Ankle 2.4 3.1 14  > 3 16 Lower Leg-Ankle 12.5 52 - 28.8    Right Sural Sensory   Calf-Lat Malleolus 2.9 3.5 17  > 5 17 Calf-Lat Malleolus 14 48  > 38 30.2    Right Ulnar Sensory   Wrist-Dig V 2.2 2.8 37  > 8 140 Wrist-Dig V 12.5 57  > 48 31.7      Inter-Nerve Comparisons     Nerve 1 Value 1 Nerve 2 Value 2 Parameter Result Normal   Sensory Sites   R Median Palm-Wrist 1.80 ms R Ulnar Palm-Wrist  1.80 ms Peak Lat Diff 0 ms <0.40     F Wave Studies     Min-F Max-F Dispersion Persistence Mean-F F-Norm L-R Mean-F L-R Mean-F Norm F/M Ratio F-M Lat (ms)   Right Tibial (Abd Hallucis)  30.9  C   44.06 50.47 6.41 100.00 46.41 <61  <5.7 2.47 40.47   Right Ulnar (Abd Dig Min)  31.5  C   26.02 29.45 3.43 100.00 27.38 <36  <2.5 1.44 23.52       Electromyography     Side Muscle Ins Act Fibs/PSW Fasc HF Amp Dur Poly Recrt Int Pat   Right Deltoid Nml None Nml 0 Nml Nml 0 Nml Nml   Right Biceps Nml None Nml 0 Nml Nml 0 Nml Nml   Right Triceps Nml None Nml 0 Nml Nml 0 Nml Nml   Right FDI Nml None Nml 0 Nml Nml 0 Nml Nml   Right Tib ant Nml None Nml 0 Nml Nml 0 Nml Nml   Right Gastroc Nml None Nml 0 Nml Nml 0 Nml Nml   Right Vastus Med Nml None Nml 0 Nml Nml 0 Nml Nml         NCS Waveforms:    Motor                  Sensory                                     Again, thank you for allowing me to participate in the care of your patient.      Sincerely,    Ag Ann MD

## 2024-11-05 DIAGNOSIS — R20.2 NUMBNESS AND TINGLING OF BOTH LEGS: ICD-10-CM

## 2024-11-05 DIAGNOSIS — R20.0 NUMBNESS AND TINGLING OF BOTH LEGS: ICD-10-CM

## 2024-11-05 DIAGNOSIS — R20.0 NUMBNESS AND TINGLING IN BOTH HANDS: ICD-10-CM

## 2024-11-05 DIAGNOSIS — M54.2 NECK PAIN: ICD-10-CM

## 2024-11-05 DIAGNOSIS — R20.2 NUMBNESS AND TINGLING IN BOTH HANDS: ICD-10-CM

## 2024-11-05 RX ORDER — GABAPENTIN 100 MG/1
CAPSULE ORAL
Qty: 120 CAPSULE | Refills: 0 | Status: SHIPPED | OUTPATIENT
Start: 2024-11-05

## 2024-12-31 ENCOUNTER — MYC REFILL (OUTPATIENT)
Dept: FAMILY MEDICINE | Facility: CLINIC | Age: 34
End: 2024-12-31
Payer: COMMERCIAL

## 2024-12-31 DIAGNOSIS — R20.0 NUMBNESS AND TINGLING OF BOTH LEGS: ICD-10-CM

## 2024-12-31 DIAGNOSIS — R20.2 NUMBNESS AND TINGLING IN BOTH HANDS: ICD-10-CM

## 2024-12-31 DIAGNOSIS — R20.0 NUMBNESS AND TINGLING IN BOTH HANDS: ICD-10-CM

## 2024-12-31 DIAGNOSIS — M54.2 NECK PAIN: ICD-10-CM

## 2024-12-31 DIAGNOSIS — R20.2 NUMBNESS AND TINGLING OF BOTH LEGS: ICD-10-CM

## 2024-12-31 RX ORDER — GABAPENTIN 100 MG/1
CAPSULE ORAL
Qty: 120 CAPSULE | Refills: 0 | Status: SHIPPED | OUTPATIENT
Start: 2024-12-31 | End: 2024-12-31

## 2024-12-31 RX ORDER — GABAPENTIN 100 MG/1
CAPSULE ORAL
Qty: 120 CAPSULE | Refills: 0 | Status: SHIPPED | OUTPATIENT
Start: 2024-12-31

## 2024-12-31 RX ORDER — GABAPENTIN 300 MG/1
300 CAPSULE ORAL 2 TIMES DAILY
Qty: 60 CAPSULE | Refills: 2 | Status: SHIPPED | OUTPATIENT
Start: 2024-12-31

## 2024-12-31 NOTE — TELEPHONE ENCOUNTER
Patient Comment: Can I do an increase on this dosage please. I have 3 days left. 200 twice a day is starting to not be effective     Last Visit: 8/29/2024 (Virtual)    Last office visit: 7/9/2024     Future Appointments 12/31/2024 - 6/29/2025      None            Requested Prescriptions   Pending Prescriptions Disp Refills    gabapentin (NEURONTIN) 100 MG capsule 120 capsule 0     Sig: TAKE TWO CAPSULES BY MOUTH TWICE DAILY       There is no refill protocol information for this order

## 2025-02-03 ENCOUNTER — MYC MEDICAL ADVICE (OUTPATIENT)
Dept: FAMILY MEDICINE | Facility: CLINIC | Age: 35
End: 2025-02-03
Payer: COMMERCIAL

## 2025-02-03 DIAGNOSIS — F41.1 GAD (GENERALIZED ANXIETY DISORDER): Primary | ICD-10-CM

## 2025-02-03 RX ORDER — BUSPIRONE HYDROCHLORIDE 10 MG/1
20 TABLET ORAL AT BEDTIME
Qty: 60 TABLET | Refills: 4 | Status: SHIPPED | OUTPATIENT
Start: 2025-02-03

## 2025-02-03 NOTE — TELEPHONE ENCOUNTER
RN reviewed med list with patient and confirmed medications.     Patient states she was initially taking Buspirone 10mg daily then was to increased to TID but reporting she takes Buspirone 10mg 2 tabs daily at HS.         Routing to provider to review and advise on updated rx.

## 2025-07-12 ENCOUNTER — HEALTH MAINTENANCE LETTER (OUTPATIENT)
Age: 35
End: 2025-07-12